# Patient Record
Sex: MALE | Race: WHITE | HISPANIC OR LATINO | Employment: PART TIME | URBAN - METROPOLITAN AREA
[De-identification: names, ages, dates, MRNs, and addresses within clinical notes are randomized per-mention and may not be internally consistent; named-entity substitution may affect disease eponyms.]

---
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
## 2022-01-12 DIAGNOSIS — J45.50 SEVERE PERSISTENT ASTHMA, UNSPECIFIED WHETHER COMPLICATED: ICD-10-CM

## 2022-01-12 DIAGNOSIS — L30.9 ECZEMA, UNSPECIFIED TYPE: ICD-10-CM

## 2022-01-12 RX ORDER — DUPILUMAB 300 MG/2ML
300 INJECTION, SOLUTION SUBCUTANEOUS
Qty: 4 ML | Refills: 3 | Status: CANCELLED | OUTPATIENT
Start: 2022-01-12 | End: 2022-02-09

## 2022-01-12 RX ORDER — ALBUTEROL SULFATE 0.83 MG/ML
2.5 SOLUTION RESPIRATORY (INHALATION)
Status: CANCELLED | OUTPATIENT
Start: 2022-01-12 | End: 2022-01-12

## 2022-01-12 NOTE — TELEPHONE ENCOUNTER
----- Message from Eleni Zamarripa sent at 1/12/2022 11:34 AM CST -----  Contact: Pop [phone]  Requesting an RX refill or new RX.  Is this a refill or new RX: New  RX name and strength (copy/paste from chart): albuterol (PROVENTIL/VENTOLIN HFA) 90 mcg/actuation inhaler  Is this a 30 day or 90 day RX:   Patient advised that in the future they can use their [employer] account to request a refill?:  Yes  Pharmacy name and phone # (copy/paste from chart):  [id number] - [place], [place] - [address] AT [facility]) &  Comments:     Requesting an RX refill or new RX.  Is this a refill or new RX: New  RX name and strength (copy/paste from chart): albuterol (PROVENTIL) 2.5 mg /3 mL (0.083 %) nebulizer solution  Is this a 30 day or 90 day RX:   Patient advised that in the future they can use their [employer] account to request a refill?:  Yes  Pharmacy name and phone # (copy/paste from chart):  [id number] - [place], [place] - [address] AT [facility])   Comments:     Requesting an RX refill or new RX.  Is this a refill or new RX: New  RX name and strength (copy/paste from chart): dupilumab (DUPIXENT SYRINGE) 300 mg/2 mL Syrg  Is this a 30 day or 90 day RX:   Patient advised that in the future they can use their [employer] account to request a refill?:   Pharmacy name and phone # (copy/paste from chart):  [id number] - [place], [place] - [address] AT [facility])   Comments:     Comments: PATIENT is requesting a call back. He is in need of a New Neb Machine. He states he has been using his Aunt's machine.

## 2022-01-12 NOTE — TELEPHONE ENCOUNTER
Advised patient that we would forward this refill request to Dr. Pantoja. Patient verbalized understanding. Stated that we would reach out again.

## 2022-01-13 NOTE — TELEPHONE ENCOUNTER
Called Pop and relayed message below from Dr. Pantoja. He will call to schedule appointment with Adult Allergy and let me know when that appt is so that I can send his refills to Dr. Pantoja.      Please reach out to this patient.  I saw him in June, and referred to Dr. Polanco as adult transition.  Never made appointment with him.  This is the second time since I have been asked to do refills.  Have them make the allergy appointment and let us know when it is, then I will do refills to last until then.

## 2022-01-20 ENCOUNTER — OFFICE VISIT (OUTPATIENT)
Dept: URGENT CARE | Facility: CLINIC | Age: 22
End: 2022-01-20
Payer: MEDICAID

## 2022-01-20 VITALS
RESPIRATION RATE: 16 BRPM | HEIGHT: 69 IN | TEMPERATURE: 99 F | OXYGEN SATURATION: 97 % | BODY MASS INDEX: 38.51 KG/M2 | WEIGHT: 260 LBS | SYSTOLIC BLOOD PRESSURE: 131 MMHG | DIASTOLIC BLOOD PRESSURE: 87 MMHG

## 2022-01-20 DIAGNOSIS — U07.1 COVID-19 VIRUS INFECTION: ICD-10-CM

## 2022-01-20 DIAGNOSIS — U07.1 COVID-19 VIRUS DETECTED: ICD-10-CM

## 2022-01-20 DIAGNOSIS — J02.9 SORE THROAT: Primary | ICD-10-CM

## 2022-01-20 LAB
CTP QC/QA: YES
SARS-COV-2 RDRP RESP QL NAA+PROBE: POSITIVE

## 2022-01-20 PROCEDURE — 1160F PR REVIEW ALL MEDS BY PRESCRIBER/CLIN PHARMACIST DOCUMENTED: ICD-10-PCS | Mod: CPTII,S$GLB,, | Performed by: FAMILY MEDICINE

## 2022-01-20 PROCEDURE — 3079F PR MOST RECENT DIASTOLIC BLOOD PRESSURE 80-89 MM HG: ICD-10-PCS | Mod: CPTII,S$GLB,, | Performed by: FAMILY MEDICINE

## 2022-01-20 PROCEDURE — 1159F PR MEDICATION LIST DOCUMENTED IN MEDICAL RECORD: ICD-10-PCS | Mod: CPTII,S$GLB,, | Performed by: FAMILY MEDICINE

## 2022-01-20 PROCEDURE — 1159F MED LIST DOCD IN RCRD: CPT | Mod: CPTII,S$GLB,, | Performed by: FAMILY MEDICINE

## 2022-01-20 PROCEDURE — U0002: ICD-10-PCS | Mod: QW,S$GLB,, | Performed by: FAMILY MEDICINE

## 2022-01-20 PROCEDURE — 1160F RVW MEDS BY RX/DR IN RCRD: CPT | Mod: CPTII,S$GLB,, | Performed by: FAMILY MEDICINE

## 2022-01-20 PROCEDURE — 3075F SYST BP GE 130 - 139MM HG: CPT | Mod: CPTII,S$GLB,, | Performed by: FAMILY MEDICINE

## 2022-01-20 PROCEDURE — 3075F PR MOST RECENT SYSTOLIC BLOOD PRESS GE 130-139MM HG: ICD-10-PCS | Mod: CPTII,S$GLB,, | Performed by: FAMILY MEDICINE

## 2022-01-20 PROCEDURE — 99203 OFFICE O/P NEW LOW 30 MIN: CPT | Mod: S$GLB,,, | Performed by: FAMILY MEDICINE

## 2022-01-20 PROCEDURE — 99203 PR OFFICE/OUTPT VISIT, NEW, LEVL III, 30-44 MIN: ICD-10-PCS | Mod: S$GLB,,, | Performed by: FAMILY MEDICINE

## 2022-01-20 PROCEDURE — 3008F BODY MASS INDEX DOCD: CPT | Mod: CPTII,S$GLB,, | Performed by: FAMILY MEDICINE

## 2022-01-20 PROCEDURE — U0002 COVID-19 LAB TEST NON-CDC: HCPCS | Mod: QW,S$GLB,, | Performed by: FAMILY MEDICINE

## 2022-01-20 PROCEDURE — 3079F DIAST BP 80-89 MM HG: CPT | Mod: CPTII,S$GLB,, | Performed by: FAMILY MEDICINE

## 2022-01-20 PROCEDURE — 3008F PR BODY MASS INDEX (BMI) DOCUMENTED: ICD-10-PCS | Mod: CPTII,S$GLB,, | Performed by: FAMILY MEDICINE

## 2022-01-20 RX ORDER — DEXTROMETHORPHAN HBR, GUAIFENESIN AND PSEUDOEPHEDRINE HCL 60; 380; 20 MG/1; MG/1; MG/1
1 TABLET ORAL EVERY 6 HOURS PRN
Qty: 20 TABLET | Refills: 0 | Status: SHIPPED | OUTPATIENT
Start: 2022-01-20 | End: 2022-03-10

## 2022-01-20 RX ORDER — NAPROXEN 500 MG/1
500 TABLET ORAL 2 TIMES DAILY WITH MEALS
Qty: 20 TABLET | Refills: 0 | Status: SHIPPED | OUTPATIENT
Start: 2022-01-20 | End: 2022-03-10

## 2022-01-20 NOTE — PROGRESS NOTES
"Subjective:       Patient ID: Pop Lawler is a 21 y.o. male.    Vitals:  height is 5' 9" (1.753 m) and weight is 117.9 kg (260 lb). His temperature is 98.8 °F (37.1 °C). His blood pressure is 131/87. His respiration is 16 and oxygen saturation is 97%.     Chief Complaint: Sore Throat    Sore Throat   This is a new (Pt c/o sore throat, nonproductive cough, and headaches x1 day. Unvaccinated ) problem. The current episode started yesterday. The pain is worse on the left side. There has been no fever. The pain is at a severity of 7/10. The pain is moderate. Associated symptoms include coughing, headaches, swollen glands and trouble swallowing. Treatments tried: motrin, mucinex  The treatment provided no relief.       Constitution: Negative.   HENT: Positive for sinus pressure, sore throat and trouble swallowing.    Cardiovascular: Negative.    Eyes: Negative.    Respiratory: Positive for cough.    Gastrointestinal: Negative.    Endocrine: negative.   Genitourinary: Negative.    Musculoskeletal: Negative.    Skin: Negative.    Allergic/Immunologic: Negative.    Neurological: Positive for headaches.   Hematologic/Lymphatic: Negative.    Psychiatric/Behavioral: Negative.        Objective:      Physical Exam   Constitutional: He is oriented to person, place, and time. He appears well-developed and well-nourished. He is cooperative.  Non-toxic appearance. He does not have a sickly appearance. He does not appear ill. No distress.   HENT:   Head: Normocephalic and atraumatic.   Ears:   Right Ear: Hearing, tympanic membrane, external ear and ear canal normal.   Left Ear: Hearing, tympanic membrane, external ear and ear canal normal.   Nose: Nose normal. No mucosal edema, rhinorrhea or nasal deformity. No epistaxis. Right sinus exhibits no maxillary sinus tenderness and no frontal sinus tenderness. Left sinus exhibits no maxillary sinus tenderness and no frontal sinus tenderness.   Mouth/Throat: Uvula is midline, oropharynx " is clear and moist and mucous membranes are normal. No trismus in the jaw. Normal dentition. No uvula swelling. No oropharyngeal exudate, posterior oropharyngeal edema or posterior oropharyngeal erythema.   Eyes: Conjunctivae and lids are normal. No scleral icterus.   Neck: Trachea normal and phonation normal. Neck supple. No edema present. No erythema present. No neck rigidity present.   Cardiovascular: Normal rate, regular rhythm, normal heart sounds, intact distal pulses and normal pulses.   Pulmonary/Chest: Effort normal and breath sounds normal. No respiratory distress. He has no decreased breath sounds. He has no rhonchi.   Abdominal: Normal appearance.   Musculoskeletal: Normal range of motion.         General: No deformity or edema. Normal range of motion.   Neurological: He is alert and oriented to person, place, and time. He exhibits normal muscle tone. Coordination normal.   Skin: Skin is warm, dry, intact, not diaphoretic and not pale.   Psychiatric: He has a normal mood and affect. His speech is normal and behavior is normal. Judgment and thought content normal. Cognition and memory  Nursing note and vitals reviewed.    Results for orders placed or performed in visit on 01/20/22   POCT COVID-19 Rapid Screening   Result Value Ref Range    POC Rapid COVID Positive (A) Negative     Acceptable Yes            Assessment:       1. Sore throat    2. COVID-19 virus infection          Plan:         Sore throat  -     POCT COVID-19 Rapid Screening    COVID-19 virus infection  -     pseudoephedrine-DM-guaiFENesin (POLY-VENT DM) 60- mg Tab; Take 1 tablet by mouth every 6 (six) hours as needed.  Dispense: 20 tablet; Refill: 0  -     naproxen (NAPROSYN) 500 MG tablet; Take 1 tablet (500 mg total) by mouth 2 (two) times daily with meals.  Dispense: 20 tablet; Refill: 0     Covid risk Score 3    Please drink plenty of fluids.  Please get plenty of rest.  Please return here or go to the Emergency  Department for any concerns or worsening of condition.    You have tested positive for COVID-19 today.  Please note that patients who test positive for COVID-19 are required by the CDC to undergo isolation for 5 days after their symptoms first began, followed by a 5 day period of strict mask wearing.  This isolation starts from the day you first developed symptoms, not the day of your positive test. For example, if your symptoms began on a Monday but tested positive on the following Wednesday, your 5-day isolation begins from that Monday, not the Wednesday you tested positive.  However, if you are asymptomatic (a person who does not have any symptoms) and COVID-19 positive, your 5-day isolation begins on the day you tested positive, regardless of exposure date.  Also, per the CDC guidelines, once your 5 days have passed, and you have not had fever greater than 100.4F in the last 24 hours without taking any fever reducers such as Tylenol (Acetaminophen) or Motrin (Ibuprofen), you may return to your normal activities including social distancing, wearing masks, and frequent handwashing - YOU DO NOT NEED ANOTHER TEST IN ORDER TO END YOUR QUARANTINE.     If you were prescribed a narcotic medication, do not drive or operate heavy equipment or machinery while taking these medications.    You were given a decongestant (RESCON or POLY VENT Dm).  If your insurance does not cover it or you cannot afford it, it is ok to use the over the counter products listed below.  If you do not have Hypertension or any history of palpitations, it is ok to take over the counter Sudafed or Mucinex D or Allegra-D or Claritin-D or Zyrtec-D.  If you do take one of the above, it is ok to combine that with plain over the counter Mucinex or Allegra or Claritin or Zyrtec.  If for example you are taking Zyrtec -D, you can combine that with Mucinex, but not Mucinex-D.  If you are taking Mucinex-D, you can combine that with plain Allegra or Claritin or  Zyrtec.   If you do have Hypertension or palpitations, it is safe to take Coricidin HBP for relief of sinus symptoms.    We recommend you take over the counter Flonase (Fluticasone) or another nasally inhaled steroid unless you are already taking one.  Nasal irrigation with a saline spray or Netti Pot like device per their directions is also recommended.  If not allergic, please take over the counter Tylenol (Acetaminophen) and/or Motrin (Ibuprofen) as directed for control of pain and/or fever.    We recommend Vitamin C (1000mg daily), Vitamin d3 (125mg daily), and Zinc (100mg daily) to help combat the Coronavirus.    Robitussin DM 2 teas every 4 hours as needed for cough.  If you  smoke, please stop smoking.    Please follow up with your primary care doctor or specialist as needed.  Felipa Harris MD  [phone]      You must understand that you have received treatment at an Urgent Care facility only, and that you may be  released before all of your medical problems are known or treated. Urgent Care facilities are not equipped to  handle life threatening emergencies. It is recommended that you seek care at an Emergency Department for  further evaluation of worsening or concerning symptoms, or possibly life threatening conditions as  Discussed.

## 2022-01-20 NOTE — PATIENT INSTRUCTIONS
Please drink plenty of fluids.  Please get plenty of rest.  Please return here or go to the Emergency Department for any concerns or worsening of condition.    You have tested positive for COVID-19 today.  Please note that patients who test positive for COVID-19 are required by the CDC to undergo isolation for 5 days after their symptoms first began, followed by a 5 day period of strict Mask wearing.  This isolation starts from the day you first developed symptoms, not the day of your positive test. For example, if your symptoms began on a Monday but tested positive on the following Wednesday, your 5 day isolation begins from that Monday, not the Wednesday you tested positive.  However, if you are asymptomatic (a person who does not have any symptoms) and COVID-19 positive, your 5 day isolation begins on the day you tested positive, regardless of exposure date.  Also, per the CDC guidelines, once your 5 days have passed, and you have not had fever greater than 100.4F in the last 24 hours without taking any fever reducers such as Tylenol (Acetaminophen) or Motrin (Ibuprofen), you may return to your normal activities including social distancing, wearing masks, and frequent handwashing - YOU DO NOT NEED ANOTHER TEST IN ORDER TO END YOUR QUARANTINE.     If you were prescribed a narcotic medication, do not drive or operate heavy equipment or machinery while taking these medications.    You were given a decongestant (RESCON or POLY VENT Dm).  If your insurance does not cover it or you cannot afford it, it is ok to use the over the counter products listed below.  If you do not have Hypertension or any history of palpitations, it is ok to take over the counter Sudafed or Mucinex D or Allegra-D or Claritin-D or Zyrtec-D.  If you do take one of the above, it is ok to combine that with plain over the counter Mucinex or Allegra or Claritin or Zyrtec.  If for example you are taking Zyrtec -D, you can combine that with Mucinex,  but not Mucinex-D.  If you are taking Mucinex-D, you can combine that with plain Allegra or Claritin or Zyrtec.   If you do have Hypertension or palpitations, it is safe to take Coricidin HBP for relief of sinus symptoms.    We recommend you take over the counter Flonase (Fluticasone) or another nasally inhaled steroid unless you are already taking one.  Nasal irrigation with a saline spray or Netti Pot like device per their directions is also recommended.  If not allergic, please take over the counter Tylenol (Acetaminophen) and/or Motrin (Ibuprofen) as directed for control of pain and/or fever.    We recommend Vitamin C (1000mg daily), Vitamin d3 (125mg daily), and Zinc (100mg daily) to help combat the Coronavirus.    Robitussin DM 2 teas every 4 hours as needed for cough.  If you  smoke, please stop smoking.    Please follow up with your primary care doctor or specialist as needed.  Felipa Harris MD  [phone]    Covid risk Score 3    You must understand that you have received treatment at an Urgent Care facility only, and that you may be  released before all of your medical problems are known or treated. Urgent Care facilities are not equipped to  handle life threatening emergencies. It is recommended that you seek care at an Emergency Department for  further evaluation of worsening or concerning symptoms, or possibly life threatening conditions as  Discussed.    Instructions for Patients with Confirmed or Suspected COVID-19    If you are awaiting your test result, you will either be called or it will be released to the patient portal.  If you have any questions about your test, please visit [email or web address] or call our COVID-19 information line at [phone].      Instructions for non-hospitalized or discharged patients with confirmed or suspected COVID-19:       Stay home except to get medical care.    Separate yourself from other people and animals in your home.    Call ahead  before visiting your doctor.    Wear a face mask.    Cover your coughs and sneezes.    Clean your hands often.    Avoid sharing personal household items.    Clean all high-touch surfaces every day.    Monitor your symptoms. Seek prompt medical attention if your illness is worsening (e.g., difficulty breathing). Before seeking care, call your healthcare provider.    If you have a medical emergency and must call 911, notify the dispatcher that you have or are being evaluated for COVID-19. If possible, put on a face mask before emergency medical services arrive.    Use the following symptom-based strategy to return to normal activity following a suspected or confirmed case of COVID-19. Continue isolation until:   o At least 1 days (24 hours) have passed since recovery defined as resolution of fever without the use of fever-reducing medications and improvement in respiratory symptoms (e.g. cough, shortness of breath), and   o At least 5 days have passed since the first positive test.       As one of the next steps, you will receive a call or text from the [place] Department of Health ([facility]) COVID-19 Tracing Team. See the contact information below so you know not to ignore the health departments call. It is important that you contact them back immediately so they can help.     Contact Tracer Number:  [phone]  Caller ID for most carriers: [place] Dept Health    What is contact tracing?   Contact tracing is a process that helps identify everyone who has been in close contact with an infected person. Contact tracers let those people know they may have been exposed and guide them on next steps. Confidentiality is important for everyone; no one will be told who may have exposed them to the virus.   Your involvement is important. The more we know about where and how this virus is spreading, the better chance we have at stopping it from spreading further.  What does exposure mean?   Exposure means you have been  within 6 feet for more than 15 minutes with a person who has or had COVID-19.  What kind of questions do the contact tracers ask?   A contact tracer will confirm your basic contact information including name, address, phone number, and next of kin, as well as asking about any symptoms you may have had. Theyll also ask you how you think you may have gotten sick, such as places where you may have been exposed to the virus, and people you were with. Those names will never be shared with anyone outside of that call, and will only be used to help trace and stop the spread of the virus.   I have privacy concerns. How will the state use my information?   Your privacy about your health is important. All calls are completed using call centers that use the appropriate health privacy protection measures (HIPAA compliance), meaning that your patient information is safe. No one will ever ask you any questions related to immigration status. Your health comes first.   Do I have to participate?   You do not have to participate, but we strongly encourage you to. Contact tracing can help us catch and control new outbreaks as theyre developing to keep your friends and family safe.   What if I dont hear from anyone?   If you dont receive a call within 24 hours, you can call the number above right away to inquire about your status. That line is open from 8:00 am - 8:00 p.m., 7 days a week.  Contact tracing saves lives! Together, we have the power to beat this virus and keep our loved ones and neighbors safe.       Instructions for household members, intimate partners and caregivers in a non-healthcare setting of a patient with confirmed or suspected COVID-19:         Close contacts should monitor their health and call their healthcare provider right away if they develop symptoms suggestive of COVID-19 (e.g., fever, cough, shortness of breath).    Stay home except to get medical care. Separate yourself from other people and  animals in the home.   Monitor the patients symptoms. If the patient is getting sicker, call his or her healthcare provider. If the patient has a medical emergency and you need to call 911, notify the dispatch personnel that the patient has or is being evaluated for COVID-19.    Wear a facemask when around other people such as sharing a room or vehicle and before entering a healthcare provider's office.   Cover coughs and sneezes with a tissue. Throw used tissues in a lined trash can immediately and wash hands.   Clean hands often with soap and water for at least 20 seconds or with an alcohol-based hand , rubbing hands together until they feel dry. Avoid touching your eyes, nose, and mouth with unwashed hands.   Clean all high-touch; surfaces every day, including counters, tabletops, doorknobs, bathroom fixtures, toilets, phones, keyboards, tablets, bedside tables, etc. Use a household cleaning spray or wipe according to label instructions.   Avoid sharing personal household items such as dishes, drinking glasses, cups, towels, bedding, etc. After these items are used, they should be washed thoroughly with soap and water.   Continue isolation until:   At least 1 days (72 hours) have passed since recovery defined as resolution of fever without the use of fever-reducing medications and improvement in respiratory symptoms (e.g. cough, shortness of breath), and    At least 5 days have passed since the patients first positive test.    https://www.cdc.gov/coronavirus/2019-ncov/your-health/index.htm

## 2022-01-20 NOTE — LETTER
[address], [facility] [address] ? [place], [address] ? Phone [phone] ? Fax [phone]           Return to Work/School Status    Patient: Pop Lawler  YOB: 2000  Date: January 20, 2022      [facility] has adopted CDCs time-based return to work/school strategy for persons with confirmed or suspected COVID19. [facility] does not recommend using a test-based strategy for returning to work/school after COVID-19 infection. [facility] has reported prolonged positive test results without evidence of infectiousness. At this time, positive specimens capable of producing disease have not been isolated more than 9 days after onset of illness.   Symptomatic persons with confirmed COVID-19 or suspected COVID-19 can return to work/school after:    At least 1 days (24 hours) have passed since recovery defined as resolution of fever without the use of fever-reducing medications AND improvement in respiratory symptoms (e.g., cough, shortness of breath)    AND, at least 5 days have passed since symptoms first develop.  Asymptomatic persons with confirmed COVID-19 can return to work after:   At least 5 days have passed since the positive laboratory test and the person remains asymptomatic.  More information about the science behind the symptom-based return to work/school can be found at: https://www.cdc.gov/coronavirus/2019-ncov/community/mplbutms-bdqfmawcifp-iiyruqpjh.html    Sincerely,    Theo Jennings MD

## 2022-01-31 ENCOUNTER — PATIENT MESSAGE (OUTPATIENT)
Dept: PEDIATRIC PULMONOLOGY | Facility: CLINIC | Age: 22
End: 2022-01-31
Payer: MEDICAID

## 2022-01-31 ENCOUNTER — TELEPHONE (OUTPATIENT)
Dept: ALLERGY | Facility: CLINIC | Age: 22
End: 2022-01-31
Payer: MEDICAID

## 2022-01-31 ENCOUNTER — TELEPHONE (OUTPATIENT)
Dept: PEDIATRIC PULMONOLOGY | Facility: CLINIC | Age: 22
End: 2022-01-31
Payer: MEDICAID

## 2022-01-31 DIAGNOSIS — L30.9 ECZEMA, UNSPECIFIED TYPE: ICD-10-CM

## 2022-01-31 DIAGNOSIS — J45.50 SEVERE PERSISTENT ASTHMA, UNSPECIFIED WHETHER COMPLICATED: ICD-10-CM

## 2022-01-31 RX ORDER — DUPILUMAB 300 MG/2ML
300 INJECTION, SOLUTION SUBCUTANEOUS
Qty: 4 ML | Refills: 3 | OUTPATIENT
Start: 2022-01-31 | End: 2022-02-28

## 2022-01-31 NOTE — TELEPHONE ENCOUNTER
Called and spoke to patient. Informed that the provider would like for him to see allergy regarding Dupixent and wanted us to follow up on that. The patient stated that they had reached out to schedule but he had forgotten. I provided him the number to allergy and informed him of the name of the provider Dr. Pantoja had referred him to. Informed Pop that I would let Dr. Pantoja know that the patient was working on scheduling with allergy. Will also send a message to Dr. Polanco's staff.

## 2022-01-31 NOTE — TELEPHONE ENCOUNTER
----- Message from Bryce Pride RN sent at 1/31/2022  4:02 PM CST -----  Regarding: Allergy Appointment  Good afternoon,     Dr. Pantoja placed a referral for this patient to see allergy. The patient had sent us a refill request this morning for Dupixent and Dr. Pantoja had asked us to follow up on his allergy appointment. I called and spoke to the patient who states that he had received a call to schedule but he never scheduled. I provided him with the number to call you guys but wanted to also send a staff message in case the patient does not follow through.     Thanks,   Bryce

## 2022-01-31 NOTE — TELEPHONE ENCOUNTER
Spoke with the patient and scheduled an appointment at [facility] with Dr. Polanco tomorrow, 02/01 at 2PM per his referral. Dr. Pantoja's office notified.

## 2022-01-31 NOTE — TELEPHONE ENCOUNTER
From refill encounter 1/12/22  RN note  Called Pop and relayed message below from Dr. Pantoja. He will call to schedule appointment with Adult Allergy and let me know when that appt is so that I can send his refills to Dr. Pantoja.     Note by me  Please reach out to this patient.  I saw him in June, and referred to Dr. Polanco as adult transition.  Never made appointment with him.  This is the second time since I have been asked to do refills.  Have them make the allergy appointment and let us know when it is, then I will do refills to last until then.

## 2022-02-01 ENCOUNTER — SPECIALTY PHARMACY (OUTPATIENT)
Dept: PHARMACY | Facility: CLINIC | Age: 22
End: 2022-02-01
Payer: MEDICAID

## 2022-02-01 ENCOUNTER — OFFICE VISIT (OUTPATIENT)
Dept: ALLERGY | Facility: CLINIC | Age: 22
End: 2022-02-01
Payer: MEDICAID

## 2022-02-01 VITALS — WEIGHT: 261.13 LBS | BODY MASS INDEX: 38.68 KG/M2 | HEIGHT: 69 IN

## 2022-02-01 DIAGNOSIS — J45.998 WELL CONTROLLED PERSISTENT ASTHMA: ICD-10-CM

## 2022-02-01 DIAGNOSIS — L20.9 ATOPIC DERMATITIS, UNSPECIFIED TYPE: Primary | ICD-10-CM

## 2022-02-01 DIAGNOSIS — L30.9 ECZEMA, UNSPECIFIED TYPE: ICD-10-CM

## 2022-02-01 DIAGNOSIS — Z91.013 SHELLFISH ALLERGY: ICD-10-CM

## 2022-02-01 PROCEDURE — 99999 PR PBB SHADOW E&M-EST. PATIENT-LVL III: CPT | Mod: PBBFAC,,, | Performed by: ALLERGY & IMMUNOLOGY

## 2022-02-01 PROCEDURE — 99213 OFFICE O/P EST LOW 20 MIN: CPT | Mod: PBBFAC,PO | Performed by: ALLERGY & IMMUNOLOGY

## 2022-02-01 PROCEDURE — 99999 PR PBB SHADOW E&M-EST. PATIENT-LVL III: ICD-10-PCS | Mod: PBBFAC,,, | Performed by: ALLERGY & IMMUNOLOGY

## 2022-02-01 PROCEDURE — 1159F MED LIST DOCD IN RCRD: CPT | Mod: CPTII,,, | Performed by: ALLERGY & IMMUNOLOGY

## 2022-02-01 PROCEDURE — 3008F BODY MASS INDEX DOCD: CPT | Mod: CPTII,,, | Performed by: ALLERGY & IMMUNOLOGY

## 2022-02-01 PROCEDURE — 3008F PR BODY MASS INDEX (BMI) DOCUMENTED: ICD-10-PCS | Mod: CPTII,,, | Performed by: ALLERGY & IMMUNOLOGY

## 2022-02-01 PROCEDURE — 99204 OFFICE O/P NEW MOD 45 MIN: CPT | Mod: S$PBB,,, | Performed by: ALLERGY & IMMUNOLOGY

## 2022-02-01 PROCEDURE — 99204 PR OFFICE/OUTPT VISIT, NEW, LEVL IV, 45-59 MIN: ICD-10-PCS | Mod: S$PBB,,, | Performed by: ALLERGY & IMMUNOLOGY

## 2022-02-01 PROCEDURE — 1159F PR MEDICATION LIST DOCUMENTED IN MEDICAL RECORD: ICD-10-PCS | Mod: CPTII,,, | Performed by: ALLERGY & IMMUNOLOGY

## 2022-02-01 RX ORDER — EPINEPHRINE 0.3 MG/.3ML
1 INJECTION SUBCUTANEOUS ONCE
Qty: 2 EACH | Refills: 1 | Status: SHIPPED | OUTPATIENT
Start: 2022-02-01 | End: 2022-02-01

## 2022-02-01 RX ORDER — DUPILUMAB 300 MG/2ML
300 INJECTION, SOLUTION SUBCUTANEOUS
Qty: 4 ML | Refills: 11 | Status: SHIPPED | OUTPATIENT
Start: 2022-02-01 | End: 2022-12-30 | Stop reason: SDUPTHER

## 2022-02-01 NOTE — PROGRESS NOTES
Subjective:       Patient ID: Pop Lawler is a 21 y.o. male.    Chief Complaint:  Asthma and Allergies    Referred by Dr. Sloan    HPI    Pt presents to [facility], continue Dupixent therapy. He is on Dupixent for both asthma and atopic dermatitis. Has had both since early childhood. Years ago had been on Xolair. Stopped xolair when Dupixent rxd by dermatology for his atopic dermatitis. Follows w [facility]. Lately Dupixent had been written by pulmonary.    On Dupixent, he has been able to stop daily ICS. Hasn't needed oral steroids since starting Dupixent. Albuterol use is rare. Occ needs it w over exertion.  Atopic dermatitis is well controlled w routine use Cerave moisturizer. Has not been needing topical steroids lately. Does note a slight increase in pruritus days prior to scheduled dupixent.  Recalls positive patch testing w dermatology 2 weeks ago, for recent contact dermatitis. Doesn't recall to what agents.  Also w hx shellfish allergy--needs epipen        Past Medical History:   Diagnosis Date    ADHD (attention deficit hyperactivity disorder)     Allergy, unspecified not elsewhere classified     IgE 2810, immunocap positive for multiple aeroallergens    Asthma, not well controlled     Asthma, well controlled     Conjunctivitis, allergic     Conjunctivitis, allergic     Eczema     Patient non adherence     Rhinitis, allergic     Rhinitis, allergic        Family History   Problem Relation Age of Onset    Asthma Mother     No Known Problems Father          Review of Systems   Constitutional: Negative for activity change, fatigue and fever.   HENT: Negative for congestion, postnasal drip, rhinorrhea, sinus pressure and sneezing.    Eyes: Negative for discharge, redness and itching.   Respiratory: Negative for cough, shortness of breath and wheezing.    Cardiovascular: Negative for chest pain.   Gastrointestinal: Negative for constipation, diarrhea, nausea and vomiting.    Genitourinary: Negative for difficulty urinating.   Musculoskeletal: Negative for joint swelling and myalgias.   Skin: Negative for rash.   Neurological: Negative for headaches.   Hematological: Does not bruise/bleed easily.   Psychiatric/Behavioral: Negative for behavioral problems and sleep disturbance.        Objective:   Physical Exam  Constitutional:       General: He is not in acute distress.     Appearance: He is well-developed and well-nourished. He is not diaphoretic.   HENT:      Head: Normocephalic and atraumatic.      Right Ear: Tympanic membrane and external ear normal.      Left Ear: Tympanic membrane and external ear normal.      Nose: Nose normal.      Mouth/Throat:      Mouth: Oropharynx is clear and moist.      Pharynx: No oropharyngeal exudate.   Eyes:      General:         Right eye: No discharge.         Left eye: No discharge.      Conjunctiva/sclera: Conjunctivae normal.   Neck:      Thyroid: No thyromegaly.   Cardiovascular:      Rate and Rhythm: Normal rate and regular rhythm.   Pulmonary:      Effort: Pulmonary effort is normal. No respiratory distress.      Breath sounds: Normal breath sounds. No wheezing.   Abdominal:      General: Bowel sounds are normal. There is no distension.      Palpations: Abdomen is soft.      Tenderness: There is no abdominal tenderness.   Musculoskeletal:         General: No edema. Normal range of motion.      Cervical back: Normal range of motion and neck supple.   Lymphadenopathy:      Cervical: No cervical adenopathy.   Skin:     General: Skin is warm and dry.      Findings: No erythema or rash.   Neurological:      Mental Status: He is alert and oriented to person, place, and time.      Motor: No abnormal muscle tone.   Psychiatric:         Mood and Affect: Mood and affect normal.         Behavior: Behavior normal.         Thought Content: Thought content normal.         Judgment: Judgment normal.           Assessment:       1. Atopic dermatitis,  unspecified type    2. Well controlled persistent asthma    3. Eczema, unspecified type    4. Shellfish allergy         Plan:       Pop was seen today for asthma and allergies.    Diagnoses and all orders for this visit:    Atopic dermatitis, unspecified type  -     dupilumab (DUPIXENT PEN) 300 mg/2 mL PnIj; Inject 300 mg into the skin every 14 (fourteen) days.    Well controlled persistent asthma    -     dupilumab (DUPIXENT PEN) 300 mg/2 mL PnIj; Inject 300 mg into the skin every 14 (fourteen) days.     Prn albuterol    Eczema, unspecified type  dupixent  Cont routine moisturization    Shellfish allergy  -     EPINEPHrine (EPIPEN 2-BRYAN) 0.3 mg/0.3 mL AtIn; Inject 0.3 mLs (0.3 mg total) into the muscle once. for 1 dose  Continue strict shellfish avoidance

## 2022-02-01 NOTE — TELEPHONE ENCOUNTER
Dupixent refills received. PA not required. Dupixent 300 mg into the skin every 14 day is appropriate for the diagnosis of severe persistent asthma [J45.909]. change in dosage form (intervention opened). Therapy appropriate to continue

## 2022-02-02 ENCOUNTER — SPECIALTY PHARMACY (OUTPATIENT)
Dept: PHARMACY | Facility: CLINIC | Age: 22
End: 2022-02-02
Payer: MEDICAID

## 2022-02-02 ENCOUNTER — PATIENT MESSAGE (OUTPATIENT)
Dept: PHARMACY | Facility: CLINIC | Age: 22
End: 2022-02-02
Payer: MEDICAID

## 2022-02-02 NOTE — TELEPHONE ENCOUNTER
Specialty Pharmacy - Refill Coordination  Specialty Pharmacy - Clinical Intervention    Specialty Medication Orders Linked to Encounter    Flowsheet Row Most Recent Value   Medication #1 dupilumab (DUPIXENT PEN) 300 mg/2 mL PnIj (Order#[id number], Rx#[id number])          Refill Questions - Documented Responses    Flowsheet Row Most Recent Value   Patient Availability and HIPAA Verification    Does patient want to proceed with activity? Yes   HIPAA/medical authority confirmed? Yes   Relationship to patient of person spoken to? Self   Refill Screening Questions    Changes to allergies? No   Changes to medications? No   New conditions since last clinic visit? No   Unplanned office visit, urgent care, ED, or hospital admission in the last 4 weeks? No   How does patient/caregiver feel medication is working? Excellent   Financial problems or insurance changes? No   How many doses of your specialty medications were missed in the last 4 weeks? 0   Would patient like to speak to a pharmacist? No   When does the patient need to receive the medication? 02/04/22   Refill Delivery Questions    How will the patient receive the medication? Delivery Dunia   When does the patient need to receive the medication? 02/04/22   Shipping Address Home   Address in [facility] confirmed and updated if neccessary? Yes   Expected Copay ($) 0   Is the patient able to afford the medication copay? Yes   Payment Method zero copay   Days supply of Refill 28   Supplies needed? No supplies needed   Refill activity completed? Yes   Refill activity plan Refill scheduled   Shipment/Pickup Date: 02/03/22          Current Outpatient Medications   Medication Sig    albuterol (PROVENTIL/VENTOLIN HFA) 90 mcg/actuation inhaler Inhale 2 puffs into the lungs every 4 (four) hours as needed. Rescue (Patient not taking: Reported on 6/23/2021)    azelastine (ASTELIN) 137 mcg (0.1 %) nasal spray 1 spray (137 mcg total) by Nasal route 2 (two) times daily.  (Patient not taking: Reported on 6/23/2021)    cetirizine (ZYRTEC) 10 MG tablet Take 1 tablet (10 mg total) by mouth once daily.    diphenhydrAMINE (BENADRYL) 25 mg capsule Take 25 mg by mouth once daily. Takes one 25mg capsule every morning    dupilumab (DUPIXENT PEN) 300 mg/2 mL PnIj Inject 300 mg into the skin every 14 (fourteen) days.    EPINEPHrine (EPIPEN 2-BRYAN) 0.3 mg/0.3 mL AtIn Inject 0.3 mLs (0.3 mg total) into the muscle once. for 1 dose    montelukast (SINGULAIR) 10 mg tablet Take 1 tablet (10 mg total) by mouth every evening.    naproxen (NAPROSYN) 500 MG tablet Take 1 tablet (500 mg total) by mouth 2 (two) times daily with meals. (Patient not taking: Reported on 2/1/2022)    pseudoephedrine-DM-guaiFENesin (POLY-VENT DM) 60- mg Tab Take 1 tablet by mouth every 6 (six) hours as needed. (Patient not taking: Reported on 2/1/2022)   Last reviewed on 2/1/2022  2:13 PM by Elizabeth A Bosworth, LPN    Review of patient's allergies indicates:   Allergen Reactions    Iodine and iodide containing products     Shellfish containing products     Last reviewed on  2/1/2022 2:11 PM by Elizabeth Bosworth      Tasks added this encounter   2/25/2022 - Refill Call (Auto Added)   Tasks due within next 3 months   No tasks due.     Zurdo Wooten, PharmD  [facility]  [address] [place] [id number]  Phone: [phone]  Fax: [phone]

## 2022-02-04 ENCOUNTER — PATIENT MESSAGE (OUTPATIENT)
Dept: PEDIATRIC PULMONOLOGY | Facility: CLINIC | Age: 22
End: 2022-02-04
Payer: MEDICAID

## 2022-02-08 RX ORDER — ALBUTEROL SULFATE 90 UG/1
4 AEROSOL, METERED RESPIRATORY (INHALATION) EVERY 4 HOURS PRN
Qty: 8.5 G | Refills: 3 | Status: SHIPPED | OUTPATIENT
Start: 2022-02-08 | End: 2022-09-09 | Stop reason: SDUPTHER

## 2022-02-26 ENCOUNTER — SPECIALTY PHARMACY (OUTPATIENT)
Dept: PHARMACY | Facility: CLINIC | Age: 22
End: 2022-02-26
Payer: MEDICAID

## 2022-02-26 NOTE — TELEPHONE ENCOUNTER
Specialty Pharmacy - Refill Coordination    Specialty Medication Orders Linked to Encounter    Flowsheet Row Most Recent Value   Medication #1 DUPIXENT SYRINGE 300 mg/2 mL Syrg (Order#[id number], Rx#[id number])          Refill Questions - Documented Responses    Flowsheet Row Most Recent Value   Patient Availability and HIPAA Verification    Does patient want to proceed with activity? Yes   HIPAA/medical authority confirmed? Yes   Relationship to patient of person spoken to? Self   Refill Screening Questions    Changes to allergies? No   Changes to medications? No   New conditions since last clinic visit? No   Unplanned office visit, urgent care, ED, or hospital admission in the last 4 weeks? No   How does patient/caregiver feel medication is working? Excellent   Financial problems or insurance changes? No   How many doses of your specialty medications were missed in the last 4 weeks? 0   Would patient like to speak to a pharmacist? No   When does the patient need to receive the medication? 03/04/22   Refill Delivery Questions    How will the patient receive the medication? Delivery Dunia   When does the patient need to receive the medication? 03/04/22   Shipping Address Home   Address in [facility] confirmed and updated if neccessary? Yes   Expected Copay ($) 0   Is the patient able to afford the medication copay? Yes   Payment Method zero copay   Days supply of Refill 28   Supplies needed? No supplies needed   Refill activity completed? Yes   Refill activity plan Refill scheduled   Shipment/Pickup Date: 03/02/22          Current Outpatient Medications   Medication Sig    albuterol (PROVENTIL/VENTOLIN HFA) 90 mcg/actuation inhaler Inhale 4 puffs into the lungs every 4 (four) hours as needed for Wheezing or Shortness of Breath (and/or persistent coughing). Rescue    azelastine (ASTELIN) 137 mcg (0.1 %) nasal spray 1 spray (137 mcg total) by Nasal route 2 (two) times daily. (Patient not taking: Reported on  6/23/2021)    cetirizine (ZYRTEC) 10 MG tablet Take 1 tablet (10 mg total) by mouth once daily.    diphenhydrAMINE (BENADRYL) 25 mg capsule Take 25 mg by mouth once daily. Takes one 25mg capsule every morning    dupilumab (DUPIXENT PEN) 300 mg/2 mL PnIj Inject 300 mg into the skin every 14 (fourteen) days.    EPINEPHrine (EPIPEN 2-BRYAN) 0.3 mg/0.3 mL AtIn Inject 0.3 mLs (0.3 mg total) into the muscle once. for 1 dose    montelukast (SINGULAIR) 10 mg tablet Take 1 tablet (10 mg total) by mouth every evening.    naproxen (NAPROSYN) 500 MG tablet Take 1 tablet (500 mg total) by mouth 2 (two) times daily with meals. (Patient not taking: Reported on 2/1/2022)    pseudoephedrine-DM-guaiFENesin (POLY-VENT DM) 60- mg Tab Take 1 tablet by mouth every 6 (six) hours as needed. (Patient not taking: Reported on 2/1/2022)   Last reviewed on 2/1/2022  2:13 PM by Elizabeth A Bosworth, LPN    Review of patient's allergies indicates:   Allergen Reactions    Iodine and iodide containing products     Shellfish containing products     Last reviewed on  2/1/2022 2:11 PM by Elizabeth Bosworth      Tasks added this encounter   3/25/2022 - Refill Call (Auto Added)   Tasks due within next 3 months   No tasks due.     [facility]  [address] [place] [id number]  Phone: [phone]  Fax: [phone]

## 2022-03-10 ENCOUNTER — OFFICE VISIT (OUTPATIENT)
Dept: PEDIATRIC PULMONOLOGY | Facility: CLINIC | Age: 22
End: 2022-03-10
Payer: MEDICAID

## 2022-03-10 VITALS
HEART RATE: 90 BPM | OXYGEN SATURATION: 98 % | RESPIRATION RATE: 16 BRPM | WEIGHT: 261.44 LBS | BODY MASS INDEX: 38.72 KG/M2 | HEIGHT: 69 IN

## 2022-03-10 DIAGNOSIS — J45.50 SEVERE PERSISTENT ASTHMA WITHOUT COMPLICATION: Primary | ICD-10-CM

## 2022-03-10 PROCEDURE — 1160F RVW MEDS BY RX/DR IN RCRD: CPT | Mod: CPTII,S$GLB,, | Performed by: GENERAL ACUTE CARE HOSPITAL

## 2022-03-10 PROCEDURE — 99214 PR OFFICE/OUTPT VISIT, EST, LEVL IV, 30-39 MIN: ICD-10-PCS | Mod: 25,S$GLB,, | Performed by: GENERAL ACUTE CARE HOSPITAL

## 2022-03-10 PROCEDURE — 99214 OFFICE O/P EST MOD 30 MIN: CPT | Mod: 25,S$GLB,, | Performed by: GENERAL ACUTE CARE HOSPITAL

## 2022-03-10 PROCEDURE — 94664 DEMO&/EVAL PT USE INHALER: CPT | Mod: S$GLB,,, | Performed by: GENERAL ACUTE CARE HOSPITAL

## 2022-03-10 PROCEDURE — 1160F PR REVIEW ALL MEDS BY PRESCRIBER/CLIN PHARMACIST DOCUMENTED: ICD-10-PCS | Mod: CPTII,S$GLB,, | Performed by: GENERAL ACUTE CARE HOSPITAL

## 2022-03-10 PROCEDURE — 1159F PR MEDICATION LIST DOCUMENTED IN MEDICAL RECORD: ICD-10-PCS | Mod: CPTII,S$GLB,, | Performed by: GENERAL ACUTE CARE HOSPITAL

## 2022-03-10 PROCEDURE — 94664 PR DEMO &/OR EVAL,PT USE,AEROSOL DEVICE: ICD-10-PCS | Mod: S$GLB,,, | Performed by: GENERAL ACUTE CARE HOSPITAL

## 2022-03-10 PROCEDURE — 1159F MED LIST DOCD IN RCRD: CPT | Mod: CPTII,S$GLB,, | Performed by: GENERAL ACUTE CARE HOSPITAL

## 2022-03-10 PROCEDURE — 3008F PR BODY MASS INDEX (BMI) DOCUMENTED: ICD-10-PCS | Mod: CPTII,S$GLB,, | Performed by: GENERAL ACUTE CARE HOSPITAL

## 2022-03-10 PROCEDURE — 3008F BODY MASS INDEX DOCD: CPT | Mod: CPTII,S$GLB,, | Performed by: GENERAL ACUTE CARE HOSPITAL

## 2022-03-10 RX ORDER — HYDROCORTISONE 25 MG/G
OINTMENT TOPICAL
COMMUNITY
Start: 2022-01-05

## 2022-03-10 NOTE — PATIENT INSTRUCTIONS
Summary    1. Asthma    Continue Dupixent as prescribed    Continue the following rescue medications:  -Albuterol MDI 4 puffs every 4 hours as needed with spacer     2.Asthma action plan and spacer education provided    3. Soak and Seal  The Soak and Seal method of treating eczema is recommended by many providers to combat dry skin and reduce flares. To get the full therapeutic benefit, Soak and Seal often and follow these steps in order.  Instructions to Soak and Seal:  Take a bath using lukewarm (not hot) water for five to 10 minutes. Use a gentle cleanser (no soaps) and avoid scrubbing the affected skin.  After bathing, pat the skin lightly with a towel leaving it slightly damp.  Apply prescription topical medication(Hydrocortisone and Cerave) to the affected areas of skin as directed.  Within three minutes, liberally apply a moisturizer all over the body. Its important to apply the moisturizer within three minutes or the skin may become even drier.  Wait a few minutes to let the moisturizer absorb into the skin before dressing or applying wet wraps.      Follow up in 3 months              Asthma Action Plan for Pop Lawler     Pulmonologist:  Dr. Leyden Lozada  Contact number:  [phone]     Rescue medication:  Albuterol  Control medication(s): Dupixent     Please bring this plan and all your medications to each visit to our office or the emergency room.    GREEN ZONE: Doing Well   No cough, wheeze, chest tightness or shortness of breath during the day or night  Can do your usual activities  If a peak flow meter is used, peak flow 80% or more of my best    Take this medication each day   Medicine How much to take When to take it   Dupixent  Every 2 weeks                           Take this medication before exercise if your asthma is exercise-induced   Medicine How much to take When to take it   Albuterol 2 puffs 15 minutes before exercise            YELLOW ZONE: Asthma is Getting Worse   Cough,  wheeze, chest tightness or shortness of breath or  Waking at night due to asthma, or  Can do some, but not all, usual activities, or   If a peak flow meter is used, peak flow between 50 to 79% of my best     First:  Take rescue medication, and keep taking your GREEN ZONE medication(s)  Take Albuterol inhaler 4 puffs every 20 minutes for up to 1 hour, or  Take 1 vial(s) of nebulized Albuterol every 20 minutes for up to 1 hour    Second:  If your symptoms (and peak flow) return to the Green Zone 20 minutes after the last rescue treatment:  Continue the rescue medication every four hours for 1 or 2 days  Call your pulmonologist for continued symptoms despite this therapy    If your symptoms (and peak flow) do not return to the Green Zone 20 minutes after the last rescue treatment:  Take another dose of the rescue medication     If available, start oral steroid as directed on the medication bottle  Call your pulmonologist  Follow RED ZONE instructions if unable to reach your pulmonologist after 20 minutes      RED ZONE: Medical Alert!   Very short of breath, or    Trouble walking or talking due to shortness of breath, or    Lips or fingernails are blue, or  Rescue medications has not helped, or  If a peak flow meter is used, peak flow less than 50% of your best    Take these actions:  Take Albuterol inhaler 8 puffs, or  Take 2 vial(s) of nebulized Albuterol   If available, start oral steroid as directed on the medication bottle  Call 911 or go to the closest emergency room NOW  Take Albuterol inhaler 8 puffs, or 2 vial(s) of nebulized Albuterol every 20 minutes until arrival by EMS or at the ER  Call your pulmonologist      Thank you for choosing our clinic.  Please read below to learn more about contacting our office.     Normal business hours are 8 AM to 5 PM Monday through Friday.     After-Hours     If you need help quickly, please call 911 or go to the nearest emergency room. If your child is sick and you need same  day medical advice please call [phone].     For all other questions, the best way to contact us is My Chart. If do not have [email or web address], our staff can help you sign up.  [employer] messages are answered within 3 business days.     Leyden Lozada, M.D.  Pediatric Pulmonology and Sleep Staff  [facility]  Office: [phone]  Fax: [phone]

## 2022-03-10 NOTE — PROGRESS NOTES
Pediatric Pulmonology clinic  Follow up    Pop is a 21 y.o. male here for asthma follow up.    HPI/RESPIRATORY SYMPTOMS:     Pop is a 21 year old male with history of severe eczema, asthma, here for follow up. Patient was last seen by Dr. Pantoja in 6/2021. Symptoms well controlled on Dupixent alone. Ok to be off ICS.      Interval changes  No ED/hospitalizations. Denies any baseline symptoms.    Asthma Symptoms/Control:  Controllers: Dupixent every 2 weeks  How often missing/week: never  Rescue: Albuterol (Last >6 months)  Spacer use: yes  OCS course since last visit: none  Triggers: URI, weather change  His current symptoms in the last 3 months include:    Cough - 0 per week  Wheezing - 0 per week  SOB - 0 per week  He does not have nocturnal coughing when not acutely ill with respiratory illness.   Prolonged coughing with a URI (2-3 weeks duration): none.  EIB: -.     Comorbidities:  AR: denies  AD: Well controlled on Dupixent.  FA: Shellfish, has unexpired epipen.  SRBD: denies snoring, breathing pauses. + mouth breathing.  GERD: denies    SH:   Lives with girlfriend and parents    Environmental history:                    Pets in the home: none                    Hiren: tile                    Air conditioning: Good condition                    Heating: Good condition                    Mold / water damage: denies                    Tobacco smoke: denies                    Goes to school to become a  and works        Travel history: denies            Current Medications:     Current Outpatient Medications:     albuterol (PROVENTIL/VENTOLIN HFA) 90 mcg/actuation inhaler, Inhale 4 puffs into the lungs every 4 (four) hours as needed for Wheezing or Shortness of Breath (and/or persistent coughing). Rescue, Disp: 8.5 g, Rfl: 3    diphenhydrAMINE (BENADRYL) 25 mg capsule, Take 25 mg by mouth once daily. Takes one 25mg capsule every morning, Disp: , Rfl:     dupilumab (DUPIXENT PEN) 300 mg/2 mL  Thais, Inject 300 mg into the skin every 14 (fourteen) days., Disp: 4 mL, Rfl: 11    hydrocortisone 2.5 % ointment, Apply topically., Disp: , Rfl:     montelukast (SINGULAIR) 10 mg tablet, Take 1 tablet (10 mg total) by mouth every evening., Disp: 30 tablet, Rfl: 11    azelastine (ASTELIN) 137 mcg (0.1 %) nasal spray, 1 spray (137 mcg total) by Nasal route 2 (two) times daily. (Patient not taking: Reported on 6/23/2021), Disp: 30 mL, Rfl: 0    cetirizine (ZYRTEC) 10 MG tablet, Take 1 tablet (10 mg total) by mouth once daily., Disp: 30 tablet, Rfl: 2    EPINEPHrine (EPIPEN 2-BRYAN) 0.3 mg/0.3 mL AtIn, Inject 0.3 mLs (0.3 mg total) into the muscle once. for 1 dose, Disp: 2 each, Rfl: 1    naproxen (NAPROSYN) 500 MG tablet, Take 1 tablet (500 mg total) by mouth 2 (two) times daily with meals. (Patient not taking: Reported on 2/1/2022), Disp: 20 tablet, Rfl: 0    pseudoephedrine-DM-guaiFENesin (POLY-VENT DM) 60- mg Tab, Take 1 tablet by mouth every 6 (six) hours as needed. (Patient not taking: Reported on 2/1/2022), Disp: 20 tablet, Rfl: 0      PMH:   Past Medical History:   Diagnosis Date    ADHD (attention deficit hyperactivity disorder)     Allergy, unspecified not elsewhere classified     IgE 2810, immunocap positive for multiple aeroallergens    Asthma, not well controlled     Asthma, well controlled     Conjunctivitis, allergic     Conjunctivitis, allergic     Eczema     Patient non adherence     Rhinitis, allergic     Rhinitis, allergic        Patient Active Problem List   Diagnosis    Eczema    Attention deficit hyperactivity disorder (ADHD)    Allergic state    Patient non adherence    Rhinitis, allergic    Conjunctivitis, allergic    Allergy    Asthma, not well controlled    Asthma, well controlled         Past medical, family, and social history were reviewed & updated as appropriate. There are no changes unless otherwise noted.        Review of Systems   Constitutional: Negative  "for activity change, appetite change, fever and irritability.   HENT: Negative for rhinorrhea.    Eyes: Negative for discharge.   Respiratory: Negative for apnea, cough, choking, wheezing and stridor.    Cardiovascular: Negative for sweating with feeds and cyanosis.   Gastrointestinal: Negative for diarrhea and vomiting.   Genitourinary: Negative for decreased urine volume.   Musculoskeletal: Negative for joint swelling.   Integumentary:  Negative for color change and rash.   Neurological: Negative for seizures.   Hematological: Does not bruise/bleed easily.     Physical Exam    Pulse 90   Resp 16   Ht 5' 9" (1.753 m)   Wt 118.6 kg (261 lb 7.5 oz)   SpO2 98%   BMI 38.61 kg/m²     General: Patient is a well-nourished, in no apparent distress. Appears well hydrated.   Head: Normocephalic, atraumatic.  Eyes: Pupils equal, round and reactive to light. Extraocular muscles appear intact. No discharge, conjunctivitis or scleral icterus. No ptosis.   Ears: Clear external auditory canals. Pinnae normal is shape and contour. No pre-auricular pits or skin tags. TMs grey bilaterally. No erythema or bulging.   Nose: Normal pink mucosa, no discharge or blood visible. Normal midline septum.   Mouth: moist mucous membranes. Pharynx: Tonsils 1. Dickey tongue position 2. Pharynx shows no erythema or ulcerations. Normal movement of soft palate. No micrognathia or retrognathia.   Neck: Grossly non-swollen. No tracheal deviation. No decrease in ROM. No lymphadenopathy, goiter or masses detected.   Chest:  No increase of accessory muscles. Lungs are clear to auscultation bilaterally. No stridor, wheezes, crackles, or rubs. Good air movement.   CV: Regular rate and rhythm. Normal S1 with normally split S2 on respiration. No murmurs, gallops or rubs. 2+ pulses. Capillary refill less than 2 sec.   Abdomen: Soft, non-tender, non-distended. Bowel signs present. No noted splenomegaly. No masses.   Extremities: Warm, no clubbing, " cyanosis or edema.       TODAY'S LABS AND EVALUATION:    Spirometry: not available    ASSESSMENT:  Pop is a 21 year old male with history of severe eczema, asthma, here for follow up. Symptoms are well controlled.     PLAN:  Continue Dupixent as prescribed  I recommended the use of the following rescue medications for asthma exacerbation:  Albuterol 4 puffs/1 vial Q4 hrs PRN cough, wheezing or dyspnea or to begin at the first start of a URI. Patient has enough refills.  Asthma action plan, spacer provided today with demonstration and educational pamphlets provided  Avoidance of precipitants    Call or return sooner if the symptoms worsen, do not improve as expected or new symptoms or problems arise.    Thank you for allowing me to assist in the care of Pop.  Please do not hesitate to contact me if I can be of further assistance.     30 minutes of total time spent on the encounter, which includes face to face time and non-face to face time preparing to see the patient (eg, review of tests), Obtaining and/or reviewing separately obtained history, Documenting clinical information in the electronic or other health record, Independently interpreting results (not separately reported) and communicating results to the patient/family/caregiver, or Care coordination (not separately reported).    Leyden Lozada, M.D.  Pediatric Pulmonology and Sleep Medicine  Office: [phone]  Fax: [phone]  March 10, 2022       cc:    [address]  [place] [id number]

## 2022-03-23 ENCOUNTER — PATIENT MESSAGE (OUTPATIENT)
Dept: PEDIATRIC PULMONOLOGY | Facility: CLINIC | Age: 22
End: 2022-03-23
Payer: MEDICAID

## 2022-03-23 RX ORDER — ALBUTEROL SULFATE 1.25 MG/3ML
SOLUTION RESPIRATORY (INHALATION)
COMMUNITY
Start: 2022-03-23 | End: 2022-10-25

## 2022-03-23 RX ORDER — ALBUTEROL SULFATE 1.25 MG/3ML
2.5 SOLUTION RESPIRATORY (INHALATION) EVERY 4 HOURS PRN
Qty: 150 ML | Refills: 0 | Status: CANCELLED | OUTPATIENT
Start: 2022-03-23

## 2022-03-23 RX ORDER — ALBUTEROL SULFATE 0.83 MG/ML
2.5 SOLUTION RESPIRATORY (INHALATION) EVERY 4 HOURS PRN
Qty: 150 ML | Refills: 0 | Status: SHIPPED | OUTPATIENT
Start: 2022-03-23 | End: 2022-09-09 | Stop reason: SDUPTHER

## 2022-03-25 ENCOUNTER — SPECIALTY PHARMACY (OUTPATIENT)
Dept: PHARMACY | Facility: CLINIC | Age: 22
End: 2022-03-25
Payer: MEDICAID

## 2022-03-25 NOTE — TELEPHONE ENCOUNTER
Specialty Pharmacy - Refill Coordination    Specialty Medication Orders Linked to Encounter    Flowsheet Row Most Recent Value   Medication #1 dupilumab (DUPIXENT PEN) 300 mg/2 mL PnIj (Order#[id number], Rx#[id number])          Refill Questions - Documented Responses    Flowsheet Row Most Recent Value   Patient Availability and HIPAA Verification    Does patient want to proceed with activity? Yes   HIPAA/medical authority confirmed? Yes   Relationship to patient of person spoken to? Self   Refill Screening Questions    Changes to allergies? No   Changes to medications? No   New conditions since last clinic visit? No   Unplanned office visit, urgent care, ED, or hospital admission in the last 4 weeks? No   How does patient/caregiver feel medication is working? Good   Financial problems or insurance changes? No   How many doses of your specialty medications were missed in the last 4 weeks? 0   Would patient like to speak to a pharmacist? No   When does the patient need to receive the medication? 04/01/22   Refill Delivery Questions    How will the patient receive the medication? Delivery Dunia   When does the patient need to receive the medication? 04/01/22   Shipping Address Home   Address in [facility] confirmed and updated if neccessary? Yes   Expected Copay ($) 0   Is the patient able to afford the medication copay? Yes   Payment Method zero copay   Days supply of Refill 28   Supplies needed? No supplies needed   Refill activity completed? Yes   Refill activity plan Refill scheduled   Shipment/Pickup Date: 03/30/22          Current Outpatient Medications   Medication Sig    albuterol (ACCUNEB) 1.25 mg/3 mL Nebu Take by nebulization.    albuterol (PROVENTIL) 2.5 mg /3 mL (0.083 %) nebulizer solution Take 3 mLs (2.5 mg total) by nebulization every 4 (four) hours as needed for Wheezing or Shortness of Breath. Rescue    albuterol (PROVENTIL/VENTOLIN HFA) 90 mcg/actuation inhaler Inhale 4 puffs into the lungs  every 4 (four) hours as needed for Wheezing or Shortness of Breath (and/or persistent coughing). Rescue    azelastine (ASTELIN) 137 mcg (0.1 %) nasal spray 1 spray (137 mcg total) by Nasal route 2 (two) times daily. (Patient not taking: Reported on 6/23/2021)    cetirizine (ZYRTEC) 10 MG tablet Take 1 tablet (10 mg total) by mouth once daily.    diphenhydrAMINE (BENADRYL) 25 mg capsule Take 25 mg by mouth once daily. Takes one 25mg capsule every morning    dupilumab (DUPIXENT PEN) 300 mg/2 mL PnIj Inject 300 mg into the skin every 14 (fourteen) days.    EPINEPHrine (EPIPEN 2-BRYAN) 0.3 mg/0.3 mL AtIn Inject 0.3 mLs (0.3 mg total) into the muscle once. for 1 dose    hydrocortisone 2.5 % ointment Apply topically.    montelukast (SINGULAIR) 10 mg tablet Take 1 tablet (10 mg total) by mouth every evening.   Last reviewed on 3/10/2022  1:21 PM by Leyden M. Lozada-Jimenez, MD    Review of patient's allergies indicates:   Allergen Reactions    Iodine and iodide containing products     Shellfish containing products     Last reviewed on  3/23/2022 4:10 PM by Leyden M Lozada-Jimenez      Tasks added this encounter   4/22/2022 - Refill Call (Auto Added)   Tasks due within next 3 months   No tasks due.     [facility]  [address] [place] [id number]  Phone: [phone]  Fax: [phone]

## 2022-04-18 ENCOUNTER — PATIENT MESSAGE (OUTPATIENT)
Dept: ADMINISTRATIVE | Facility: OTHER | Age: 22
End: 2022-04-18
Payer: MEDICAID

## 2022-04-22 ENCOUNTER — SPECIALTY PHARMACY (OUTPATIENT)
Dept: PHARMACY | Facility: CLINIC | Age: 22
End: 2022-04-22
Payer: MEDICAID

## 2022-04-22 NOTE — TELEPHONE ENCOUNTER
Specialty Pharmacy - Refill Coordination    Specialty Medication Orders Linked to Encounter    Flowsheet Row Most Recent Value   Medication #1 dupilumab (DUPIXENT PEN) 300 mg/2 mL PnIj (Order#[id number], Rx#[id number])          Refill Questions - Documented Responses    Flowsheet Row Most Recent Value   Patient Availability and HIPAA Verification    Does patient want to proceed with activity? Yes   HIPAA/medical authority confirmed? Yes   Relationship to patient of person spoken to? Self   Refill Screening Questions    Changes to allergies? No   Changes to medications? No   New conditions since last clinic visit? No   Unplanned office visit, urgent care, ED, or hospital admission in the last 4 weeks? No   How does patient/caregiver feel medication is working? Good   Financial problems or insurance changes? No   How many doses of your specialty medications were missed in the last 4 weeks? 0   Would patient like to speak to a pharmacist? No   When does the patient need to receive the medication? 04/29/22   Refill Delivery Questions    How will the patient receive the medication? Delivery Dunia   When does the patient need to receive the medication? 04/29/22   Shipping Address Home   Address in [facility] confirmed and updated if neccessary? Yes   Expected Copay ($) 0   Is the patient able to afford the medication copay? Yes   Payment Method zero copay   Days supply of Refill 28   Supplies needed? No supplies needed   Refill activity completed? Yes   Refill activity plan Refill scheduled   Shipment/Pickup Date: 04/27/22          Current Outpatient Medications   Medication Sig    albuterol (ACCUNEB) 1.25 mg/3 mL Nebu Take by nebulization.    albuterol (PROVENTIL) 2.5 mg /3 mL (0.083 %) nebulizer solution Take 3 mLs (2.5 mg total) by nebulization every 4 (four) hours as needed for Wheezing or Shortness of Breath. Rescue    albuterol (PROVENTIL/VENTOLIN HFA) 90 mcg/actuation inhaler Inhale 4 puffs into the lungs  every 4 (four) hours as needed for Wheezing or Shortness of Breath (and/or persistent coughing). Rescue    azelastine (ASTELIN) 137 mcg (0.1 %) nasal spray 1 spray (137 mcg total) by Nasal route 2 (two) times daily. (Patient not taking: Reported on 6/23/2021)    cetirizine (ZYRTEC) 10 MG tablet Take 1 tablet (10 mg total) by mouth once daily.    diphenhydrAMINE (BENADRYL) 25 mg capsule Take 25 mg by mouth once daily. Takes one 25mg capsule every morning    dupilumab (DUPIXENT PEN) 300 mg/2 mL PnIj Inject 300 mg into the skin every 14 (fourteen) days.    EPINEPHrine (EPIPEN 2-BRYAN) 0.3 mg/0.3 mL AtIn Inject 0.3 mLs (0.3 mg total) into the muscle once. for 1 dose    hydrocortisone 2.5 % ointment Apply topically.    montelukast (SINGULAIR) 10 mg tablet Take 1 tablet (10 mg total) by mouth every evening.   Last reviewed on 3/10/2022  1:21 PM by Leyden M. Lozada-Jimenez, MD    Review of patient's allergies indicates:   Allergen Reactions    Iodine and iodide containing products     Shellfish containing products     Last reviewed on  3/23/2022 4:10 PM by Leyden M Lozada-Jimenez      Tasks added this encounter   5/20/2022 - Refill Call (Auto Added)   Tasks due within next 3 months   No tasks due.     Thalia Perez [facility] - Specialty Pharmacy  [address] [place] [id number]  Phone: [phone]  Fax: [phone]

## 2022-05-20 ENCOUNTER — SPECIALTY PHARMACY (OUTPATIENT)
Dept: PHARMACY | Facility: CLINIC | Age: 22
End: 2022-05-20
Payer: MEDICAID

## 2022-05-20 NOTE — TELEPHONE ENCOUNTER
Specialty Pharmacy - Refill Coordination    Specialty Medication Orders Linked to Encounter    Flowsheet Row Most Recent Value   Medication #1 dupilumab (DUPIXENT PEN) 300 mg/2 mL PnIj (Order#[id number], Rx#[id number])          Refill Questions - Documented Responses    Flowsheet Row Most Recent Value   Patient Availability and HIPAA Verification    Does patient want to proceed with activity? Yes   HIPAA/medical authority confirmed? Yes   Relationship to patient of person spoken to? Self   Refill Screening Questions    Changes to allergies? No   Changes to medications? No   New conditions since last clinic visit? No   Unplanned office visit, urgent care, ED, or hospital admission in the last 4 weeks? No   How does patient/caregiver feel medication is working? Good   Financial problems or insurance changes? No   How many doses of your specialty medications were missed in the last 4 weeks? 0   Would patient like to speak to a pharmacist? No   When does the patient need to receive the medication? 05/27/22   Refill Delivery Questions    How will the patient receive the medication? Delivery Dunia   When does the patient need to receive the medication? 05/27/22   Shipping Address Home   Address in [facility] confirmed and updated if neccessary? Yes   Expected Copay ($) 0   Is the patient able to afford the medication copay? Yes   Payment Method zero copay   Days supply of Refill 28   Supplies needed? No supplies needed   Refill activity completed? Yes   Refill activity plan Refill scheduled   Shipment/Pickup Date: 05/25/22          Current Outpatient Medications   Medication Sig    albuterol (ACCUNEB) 1.25 mg/3 mL Nebu Take by nebulization.    albuterol (PROVENTIL) 2.5 mg /3 mL (0.083 %) nebulizer solution Take 3 mLs (2.5 mg total) by nebulization every 4 (four) hours as needed for Wheezing or Shortness of Breath. Rescue    albuterol (PROVENTIL/VENTOLIN HFA) 90 mcg/actuation inhaler Inhale 4 puffs into the lungs  every 4 (four) hours as needed for Wheezing or Shortness of Breath (and/or persistent coughing). Rescue    azelastine (ASTELIN) 137 mcg (0.1 %) nasal spray 1 spray (137 mcg total) by Nasal route 2 (two) times daily. (Patient not taking: Reported on 6/23/2021)    cetirizine (ZYRTEC) 10 MG tablet Take 1 tablet (10 mg total) by mouth once daily.    diphenhydrAMINE (BENADRYL) 25 mg capsule Take 25 mg by mouth once daily. Takes one 25mg capsule every morning    dupilumab (DUPIXENT PEN) 300 mg/2 mL PnIj Inject 300 mg into the skin every 14 (fourteen) days.    EPINEPHrine (EPIPEN 2-BRYAN) 0.3 mg/0.3 mL AtIn Inject 0.3 mLs (0.3 mg total) into the muscle once. for 1 dose    hydrocortisone 2.5 % ointment Apply topically.    montelukast (SINGULAIR) 10 mg tablet Take 1 tablet (10 mg total) by mouth every evening.   Last reviewed on 3/10/2022  1:21 PM by Leyden M. Lozada-Jimenez, MD    Review of patient's allergies indicates:   Allergen Reactions    Iodine and iodide containing products     Shellfish containing products     Last reviewed on  3/23/2022 4:10 PM by Leyden M Lozada-Jimenez      Tasks added this encounter   No tasks added.   Tasks due within next 3 months   5/20/2022 - Refill Call (Auto Added)     Rober Perez [facility] - Specialty Pharmacy  [address] [place] [id number]  Phone: [phone]  Fax: [phone]

## 2022-06-20 ENCOUNTER — SPECIALTY PHARMACY (OUTPATIENT)
Dept: PHARMACY | Facility: CLINIC | Age: 22
End: 2022-06-20
Payer: MEDICAID

## 2022-06-20 NOTE — TELEPHONE ENCOUNTER
Specialty Pharmacy - Refill Coordination  Specialty Pharmacy - Medication/Referral Authorization    Specialty Medication Orders Linked to Encounter    Flowsheet Row Most Recent Value   Medication #1 dupilumab (DUPIXENT PEN) 300 mg/2 mL PnIj (Order#[id number], Rx#[id number])          Refill Questions - Documented Responses    Flowsheet Row Most Recent Value   Patient Availability and HIPAA Verification    Does patient want to proceed with activity? Yes   HIPAA/medical authority confirmed? Yes   Relationship to patient of person spoken to? Self   Refill Screening Questions    Changes to allergies? No   Changes to medications? No   New conditions since last clinic visit? No   Unplanned office visit, urgent care, ED, or hospital admission in the last 4 weeks? No   How does patient/caregiver feel medication is working? Very good   Financial problems or insurance changes? No   How many doses of your specialty medications were missed in the last 4 weeks? 0   Would patient like to speak to a pharmacist? No   When does the patient need to receive the medication? 06/24/22   Refill Delivery Questions    How will the patient receive the medication? Delivery Dunia   When does the patient need to receive the medication? 06/24/22   Shipping Address Home   Address in [facility] confirmed and updated if neccessary? Yes   Expected Copay ($) 0   Is the patient able to afford the medication copay? Yes   Payment Method zero copay   Days supply of Refill 28   Supplies needed? No supplies needed   Refill activity completed? Yes   Refill activity plan Refill scheduled   Shipment/Pickup Date: 06/23/22          Current Outpatient Medications   Medication Sig    albuterol (ACCUNEB) 1.25 mg/3 mL Nebu Take by nebulization.    albuterol (PROVENTIL) 2.5 mg /3 mL (0.083 %) nebulizer solution Take 3 mLs (2.5 mg total) by nebulization every 4 (four) hours as needed for Wheezing or Shortness of Breath. Rescue    albuterol (PROVENTIL/VENTOLIN  HFA) 90 mcg/actuation inhaler Inhale 4 puffs into the lungs every 4 (four) hours as needed for Wheezing or Shortness of Breath (and/or persistent coughing). Rescue    azelastine (ASTELIN) 137 mcg (0.1 %) nasal spray 1 spray (137 mcg total) by Nasal route 2 (two) times daily. (Patient not taking: Reported on 6/23/2021)    cetirizine (ZYRTEC) 10 MG tablet Take 1 tablet (10 mg total) by mouth once daily.    diphenhydrAMINE (BENADRYL) 25 mg capsule Take 25 mg by mouth once daily. Takes one 25mg capsule every morning    dupilumab (DUPIXENT PEN) 300 mg/2 mL PnIj Inject 300 mg into the skin every 14 (fourteen) days.    EPINEPHrine (EPIPEN 2-BRYAN) 0.3 mg/0.3 mL AtIn Inject 0.3 mLs (0.3 mg total) into the muscle once. for 1 dose    hydrocortisone 2.5 % ointment Apply topically.    montelukast (SINGULAIR) 10 mg tablet Take 1 tablet (10 mg total) by mouth every evening.   Last reviewed on 3/10/2022  1:21 PM by Leyden M. Lozada-Jimenez, MD    Review of patient's allergies indicates:   Allergen Reactions    Iodine and iodide containing products     Shellfish containing products     Last reviewed on  3/23/2022 4:10 PM by Leyden M Lozada-Jimenez      Tasks added this encounter   7/15/2022 - Refill Call (Auto Added)   Tasks due within next 3 months   No tasks due.     Jose Osullivan, PharmD  [facility] - Specialty Pharmacy  [address] [place] [id number]  Phone: [phone]  Fax: [phone]

## 2022-06-20 NOTE — TELEPHONE ENCOUNTER
Patient reports that he has conjunctivitis. He states that he has consulted his eye doctor, and was told to use lubricating eye drops. Patient asked if there is a way to reduce the dose of Dupixent. Advised patient that there is not.  Patient acknowledged, no other questions or concerns.     Jose Osullivan, PharmD  Clinical Pharmacist  [facility]  P: [phone]

## 2022-06-20 NOTE — TELEPHONE ENCOUNTER
PA required for dupixent. Patient due to inject 6/24. Patient also had questions on how to treat eye redness. Routing to [facility].

## 2022-06-20 NOTE — TELEPHONE ENCOUNTER
Request Reference Number: [id number]. DUPIXENT /2ML is approved through 12/20/2022. For further questions, call [facility] at [phone].

## 2022-07-15 ENCOUNTER — PATIENT MESSAGE (OUTPATIENT)
Dept: PHARMACY | Facility: CLINIC | Age: 22
End: 2022-07-15
Payer: MEDICAID

## 2022-07-18 ENCOUNTER — SPECIALTY PHARMACY (OUTPATIENT)
Dept: PHARMACY | Facility: CLINIC | Age: 22
End: 2022-07-18
Payer: MEDICAID

## 2022-07-18 ENCOUNTER — PATIENT MESSAGE (OUTPATIENT)
Dept: PHARMACY | Facility: CLINIC | Age: 22
End: 2022-07-18
Payer: MEDICAID

## 2022-07-18 NOTE — TELEPHONE ENCOUNTER
Specialty Pharmacy - Refill Coordination    Specialty Medication Orders Linked to Encounter    Flowsheet Row Most Recent Value   Medication #1 DUPIXENT SYRINGE 300 mg/2 mL Syrg (Order#[id number], Rx#[id number])          Refill Questions - Documented Responses    Flowsheet Row Most Recent Value   Patient Availability and HIPAA Verification    Does patient want to proceed with activity? Yes   HIPAA/medical authority confirmed? Yes   Relationship to patient of person spoken to? Self   Refill Screening Questions    Changes to allergies? No   Changes to medications? No   New conditions since last clinic visit? No   Unplanned office visit, urgent care, ED, or hospital admission in the last 4 weeks? No   How does patient/caregiver feel medication is working? Good   Financial problems or insurance changes? No   How many doses of your specialty medications were missed in the last 4 weeks? 0   Would patient like to speak to a pharmacist? No   When does the patient need to receive the medication? 07/22/22   Refill Delivery Questions    How will the patient receive the medication? Delivery Dunia   When does the patient need to receive the medication? 07/22/22   Shipping Address Home   Address in [facility] confirmed and updated if neccessary? Yes   Expected Copay ($) 0   Is the patient able to afford the medication copay? Yes   Payment Method zero copay   Days supply of Refill 28   Supplies needed? No supplies needed   Refill activity completed? Yes   Refill activity plan Refill scheduled   Shipment/Pickup Date: 07/19/22          Current Outpatient Medications   Medication Sig    albuterol (ACCUNEB) 1.25 mg/3 mL Nebu Take by nebulization.    albuterol (PROVENTIL) 2.5 mg /3 mL (0.083 %) nebulizer solution Take 3 mLs (2.5 mg total) by nebulization every 4 (four) hours as needed for Wheezing or Shortness of Breath. Rescue    albuterol (PROVENTIL/VENTOLIN HFA) 90 mcg/actuation inhaler Inhale 4 puffs into the lungs every 4  (four) hours as needed for Wheezing or Shortness of Breath (and/or persistent coughing). Rescue    azelastine (ASTELIN) 137 mcg (0.1 %) nasal spray 1 spray (137 mcg total) by Nasal route 2 (two) times daily. (Patient not taking: Reported on 6/23/2021)    cetirizine (ZYRTEC) 10 MG tablet Take 1 tablet (10 mg total) by mouth once daily.    diphenhydrAMINE (BENADRYL) 25 mg capsule Take 25 mg by mouth once daily. Takes one 25mg capsule every morning    dupilumab (DUPIXENT PEN) 300 mg/2 mL PnIj Inject 300 mg into the skin every 14 (fourteen) days.    EPINEPHrine (EPIPEN 2-BRYAN) 0.3 mg/0.3 mL AtIn Inject 0.3 mLs (0.3 mg total) into the muscle once. for 1 dose    hydrocortisone 2.5 % ointment Apply topically.    montelukast (SINGULAIR) 10 mg tablet Take 1 tablet (10 mg total) by mouth every evening.   Last reviewed on 3/10/2022  1:21 PM by Leyden M. Lozada-Jimenez, MD    Review of patient's allergies indicates:   Allergen Reactions    Iodine and iodide containing products     Shellfish containing products     Last reviewed on  3/23/2022 4:10 PM by Leyden M Lozada-Jimenez      Tasks added this encounter   No tasks added.   Tasks due within next 3 months   7/15/2022 - Refill Call (Auto Added)     Agatha Singh, PharmD  [facility]  [address] [place] [id number]  Phone: [phone]  Fax: [phone]

## 2022-08-12 ENCOUNTER — SPECIALTY PHARMACY (OUTPATIENT)
Dept: PHARMACY | Facility: CLINIC | Age: 22
End: 2022-08-12
Payer: MEDICAID

## 2022-08-12 NOTE — TELEPHONE ENCOUNTER
Specialty Pharmacy - Refill Coordination    Specialty Medication Orders Linked to Encounter    Flowsheet Row Most Recent Value   Medication #1 dupilumab (DUPIXENT PEN) 300 mg/2 mL PnIj (Order#[id number], Rx#[id number])          Refill Questions - Documented Responses    Flowsheet Row Most Recent Value   Patient Availability and HIPAA Verification    Does patient want to proceed with activity? Yes   HIPAA/medical authority confirmed? Yes   Relationship to patient of person spoken to? Self   Refill Screening Questions    Changes to allergies? No   Changes to medications? No   New conditions since last clinic visit? No   Unplanned office visit, urgent care, ED, or hospital admission in the last 4 weeks? No   How does patient/caregiver feel medication is working? Good   Financial problems or insurance changes? No   How many doses of your specialty medications were missed in the last 4 weeks? 0   Would patient like to speak to a pharmacist? No   When does the patient need to receive the medication? 08/19/22   Refill Delivery Questions    How will the patient receive the medication? Delivery Dunia   When does the patient need to receive the medication? 08/19/22   Shipping Address Home   Address in [facility] confirmed and updated if neccessary? Yes   Expected Copay ($) 0   Is the patient able to afford the medication copay? Yes   Payment Method zero copay   Days supply of Refill 28   Supplies needed? No supplies needed   Refill activity completed? Yes   Refill activity plan Refill scheduled   Shipment/Pickup Date: 08/15/22          Current Outpatient Medications   Medication Sig    albuterol (ACCUNEB) 1.25 mg/3 mL Nebu Take by nebulization.    albuterol (PROVENTIL) 2.5 mg /3 mL (0.083 %) nebulizer solution Take 3 mLs (2.5 mg total) by nebulization every 4 (four) hours as needed for Wheezing or Shortness of Breath. Rescue    albuterol (PROVENTIL/VENTOLIN HFA) 90 mcg/actuation inhaler Inhale 4 puffs into the lungs  every 4 (four) hours as needed for Wheezing or Shortness of Breath (and/or persistent coughing). Rescue    azelastine (ASTELIN) 137 mcg (0.1 %) nasal spray 1 spray (137 mcg total) by Nasal route 2 (two) times daily. (Patient not taking: Reported on 6/23/2021)    cetirizine (ZYRTEC) 10 MG tablet Take 1 tablet (10 mg total) by mouth once daily.    diphenhydrAMINE (BENADRYL) 25 mg capsule Take 25 mg by mouth once daily. Takes one 25mg capsule every morning    dupilumab (DUPIXENT PEN) 300 mg/2 mL PnIj Inject 300 mg into the skin every 14 (fourteen) days.    EPINEPHrine (EPIPEN 2-BRYAN) 0.3 mg/0.3 mL AtIn Inject 0.3 mLs (0.3 mg total) into the muscle once. for 1 dose    hydrocortisone 2.5 % ointment Apply topically.    montelukast (SINGULAIR) 10 mg tablet Take 1 tablet (10 mg total) by mouth every evening.   Last reviewed on 3/10/2022  1:21 PM by Leyden M. Lozada-Jimenez, MD    Review of patient's allergies indicates:   Allergen Reactions    Iodine and iodide containing products     Shellfish containing products     Last reviewed on  3/23/2022 4:10 PM by Leyden M Lozada-Jimenez      Tasks added this encounter   9/9/2022 - Refill Call (Auto Added)   Tasks due within next 3 months   No tasks due.     [facility]  [address] [place] [id number]  Phone: [phone]  Fax: [phone]

## 2022-08-23 ENCOUNTER — TELEPHONE (OUTPATIENT)
Dept: PEDIATRIC PULMONOLOGY | Facility: CLINIC | Age: 22
End: 2022-08-23
Payer: MEDICAID

## 2022-09-09 ENCOUNTER — TELEPHONE (OUTPATIENT)
Dept: PEDIATRIC PULMONOLOGY | Facility: CLINIC | Age: 22
End: 2022-09-09
Payer: MEDICAID

## 2022-09-09 ENCOUNTER — SPECIALTY PHARMACY (OUTPATIENT)
Dept: PHARMACY | Facility: CLINIC | Age: 22
End: 2022-09-09
Payer: MEDICAID

## 2022-09-09 ENCOUNTER — PATIENT MESSAGE (OUTPATIENT)
Dept: PEDIATRIC PULMONOLOGY | Facility: CLINIC | Age: 22
End: 2022-09-09
Payer: MEDICAID

## 2022-09-09 DIAGNOSIS — J45.50 SEVERE PERSISTENT ASTHMA, UNSPECIFIED WHETHER COMPLICATED: ICD-10-CM

## 2022-09-09 DIAGNOSIS — R05.9 COUGH: ICD-10-CM

## 2022-09-09 RX ORDER — ALBUTEROL SULFATE 90 UG/1
4 AEROSOL, METERED RESPIRATORY (INHALATION) EVERY 4 HOURS PRN
Qty: 8.5 G | Refills: 3 | Status: SHIPPED | OUTPATIENT
Start: 2022-09-09 | End: 2022-10-25 | Stop reason: SDUPTHER

## 2022-09-09 RX ORDER — ALBUTEROL SULFATE 0.83 MG/ML
2.5 SOLUTION RESPIRATORY (INHALATION) EVERY 4 HOURS PRN
Qty: 150 ML | Refills: 0 | Status: SHIPPED | OUTPATIENT
Start: 2022-09-09 | End: 2022-10-25

## 2022-09-09 RX ORDER — MONTELUKAST SODIUM 10 MG/1
10 TABLET ORAL NIGHTLY
Qty: 30 TABLET | Refills: 11 | Status: SHIPPED | OUTPATIENT
Start: 2022-09-09 | End: 2022-10-25 | Stop reason: SDUPTHER

## 2022-09-09 NOTE — TELEPHONE ENCOUNTER
Attempted to call patient about follow up appointment. LVM with a call back number. Sent [employer] message.

## 2022-09-09 NOTE — TELEPHONE ENCOUNTER
Specialty Pharmacy - Refill Coordination    Specialty Medication Orders Linked to Encounter      Flowsheet Row Most Recent Value   Medication #1 dupilumab (DUPIXENT PEN) 300 mg/2 mL PnIj (Order#[id number], Rx#[id number])            Refill Questions - Documented Responses      Flowsheet Row Most Recent Value   Patient Availability and HIPAA Verification    Does patient want to proceed with activity? Yes   HIPAA/medical authority confirmed? Yes   Relationship to patient of person spoken to? Self   Refill Screening Questions    Changes to allergies? No   Changes to medications? No   New conditions since last clinic visit? No   Unplanned office visit, urgent care, ED, or hospital admission in the last 4 weeks? No   How does patient/caregiver feel medication is working? Very good   Financial problems or insurance changes? No   How many doses of your specialty medications were missed in the last 4 weeks? 0   Would patient like to speak to a pharmacist? No   When does the patient need to receive the medication? 09/16/22   Refill Delivery Questions    How will the patient receive the medication? Delivery [place]   When does the patient need to receive the medication? 09/16/22   Shipping Address Home   Address in [facility] confirmed and updated if neccessary? Yes   Expected Copay ($) 0   Is the patient able to afford the medication copay? Yes   Payment Method zero copay   Days supply of Refill 28   Supplies needed? No supplies needed   Refill activity completed? Yes   Refill activity plan Refill scheduled   Shipment/Pickup Date: 09/13/22            Current Outpatient Medications   Medication Sig    albuterol (ACCUNEB) 1.25 mg/3 mL Nebu Take by nebulization.    albuterol (PROVENTIL) 2.5 mg /3 mL (0.083 %) nebulizer solution Take 3 mLs (2.5 mg total) by nebulization every 4 (four) hours as needed for Wheezing or Shortness of Breath. Rescue    albuterol (PROVENTIL/VENTOLIN HFA) 90 mcg/actuation inhaler Inhale 4 puffs into  the lungs every 4 (four) hours as needed for Wheezing or Shortness of Breath (and/or persistent coughing). Rescue    azelastine (ASTELIN) 137 mcg (0.1 %) nasal spray 1 spray (137 mcg total) by Nasal route 2 (two) times daily. (Patient not taking: Reported on 6/23/2021)    cetirizine (ZYRTEC) 10 MG tablet Take 1 tablet (10 mg total) by mouth once daily.    diphenhydrAMINE (BENADRYL) 25 mg capsule Take 25 mg by mouth once daily. Takes one 25mg capsule every morning    dupilumab (DUPIXENT PEN) 300 mg/2 mL PnIj Inject 300 mg into the skin every 14 (fourteen) days.    EPINEPHrine (EPIPEN 2-BRYAN) 0.3 mg/0.3 mL AtIn Inject 0.3 mLs (0.3 mg total) into the muscle once. for 1 dose    hydrocortisone 2.5 % ointment Apply topically.    montelukast (SINGULAIR) 10 mg tablet Take 1 tablet (10 mg total) by mouth every evening.   Last reviewed on 3/10/2022  1:21 PM by Leyden M. Lozada-Jimenez, MD    Review of patient's allergies indicates:   Allergen Reactions    Iodine and iodide containing products     Shellfish containing products     Last reviewed on  3/23/2022 4:10 PM by Leyden M Lozada-Jimenez      Tasks added this encounter   10/7/2022 - Refill Call (Auto Added)   Tasks due within next 3 months   No tasks due.     Josiane Zimmer, PharmD  [facility]  [address] [place] [id number]  Phone: [phone]  Fax: [phone]

## 2022-09-09 NOTE — TELEPHONE ENCOUNTER
----- Message from Lisha Partida RN sent at 9/9/2022  2:09 PM CDT -----  Moi Goel, I was able to move him to 10/25 if you can call to confirm please.    Thank you!  Lisha   ----- Message -----  From: Christine Clemente MA  Sent: 9/9/2022   2:03 PM CDT  To: LORRIE Garnett are you able to schedule this patient? Its not allowing me to schedule him either.     Thanks,  Manasa   ----- Message -----  From: Leyden M. Lozada-Jimenez, MD  Sent: 9/9/2022  12:39 PM CDT  To: Elma Helm LPN, #    Thank you Elma, just sent refills. Team can you add him to my next available pulm visit? Thanks.    Leyden  ----- Message -----  From: Elma Helm LPN  Sent: 9/9/2022  12:21 PM CDT  To: Leyden M. Lozada-Jimenez, MD    Good afternoon Dr. Leyden,    I spoke with mom. She stated that Huy isn't having any issues and that he is only in need of his refills. She wanted to know if you would be able to refill his scripts and schedule them at your first available. I tried to reschedule him but he's an adult patient so it's given me an error. Please advise.

## 2022-09-09 NOTE — TELEPHONE ENCOUNTER
Called to reschedule 9/15/22 appointment with Dr. Patel. No answer. Left voicemail to return call to clinic at their earliest convenience to reschedule this appointment..

## 2022-10-07 ENCOUNTER — SPECIALTY PHARMACY (OUTPATIENT)
Dept: PHARMACY | Facility: CLINIC | Age: 22
End: 2022-10-07
Payer: MEDICAID

## 2022-10-07 NOTE — TELEPHONE ENCOUNTER
Specialty Pharmacy - Refill Coordination    Specialty Medication Orders Linked to Encounter      Flowsheet Row Most Recent Value   Medication #1 dupilumab (DUPIXENT PEN) 300 mg/2 mL PnIj (Order#[id number], Rx#[id number])            Refill Questions - Documented Responses      Flowsheet Row Most Recent Value   Patient Availability and HIPAA Verification    Does patient want to proceed with activity? Yes   HIPAA/medical authority confirmed? Yes   Relationship to patient of person spoken to? Self   Refill Screening Questions    Changes to allergies? No   Changes to medications? No   New conditions since last clinic visit? No   Unplanned office visit, urgent care, ED, or hospital admission in the last 4 weeks? No   How does patient/caregiver feel medication is working? Good   Financial problems or insurance changes? No   How many doses of your specialty medications were missed in the last 4 weeks? 0   Would patient like to speak to a pharmacist? No   When does the patient need to receive the medication? 10/14/22   Refill Delivery Questions    How will the patient receive the medication? MEDRx   When does the patient need to receive the medication? 10/14/22   Shipping Address Home   Address in [facility] confirmed and updated if neccessary? Yes   Expected Copay ($) 0   Is the patient able to afford the medication copay? Yes   Payment Method zero copay   Days supply of Refill 28   Supplies needed? No supplies needed   Refill activity completed? Yes   Refill activity plan Refill scheduled   Shipment/Pickup Date: 10/11/22            Current Outpatient Medications   Medication Sig    albuterol (ACCUNEB) 1.25 mg/3 mL Nebu Take by nebulization.    albuterol (PROVENTIL) 2.5 mg /3 mL (0.083 %) nebulizer solution Take 3 mLs (2.5 mg total) by nebulization every 4 (four) hours as needed for Wheezing or Shortness of Breath. Rescue    albuterol (PROVENTIL/VENTOLIN HFA) 90 mcg/actuation inhaler Inhale 4 puffs into the lungs  every 4 (four) hours as needed for Wheezing or Shortness of Breath (and/or persistent coughing). Rescue    azelastine (ASTELIN) 137 mcg (0.1 %) nasal spray 1 spray (137 mcg total) by Nasal route 2 (two) times daily. (Patient not taking: Reported on 6/23/2021)    cetirizine (ZYRTEC) 10 MG tablet Take 1 tablet (10 mg total) by mouth once daily.    diphenhydrAMINE (BENADRYL) 25 mg capsule Take 25 mg by mouth once daily. Takes one 25mg capsule every morning    dupilumab (DUPIXENT PEN) 300 mg/2 mL PnIj Inject 300 mg into the skin every 14 (fourteen) days.    EPINEPHrine (EPIPEN 2-BRYAN) 0.3 mg/0.3 mL AtIn Inject 0.3 mLs (0.3 mg total) into the muscle once. for 1 dose    hydrocortisone 2.5 % ointment Apply topically.    montelukast (SINGULAIR) 10 mg tablet Take 1 tablet (10 mg total) by mouth every evening.   Last reviewed on 3/10/2022  1:21 PM by Leyden M. Lozada-Jimenez, MD    Review of patient's allergies indicates:   Allergen Reactions    Iodine and iodide containing products     Shellfish containing products     Last reviewed on  9/9/2022 12:38 PM by Leyden M Lozada-Jimenez      Tasks added this encounter   11/4/2022 - Refill Call (Auto Added)   Tasks due within next 3 months   No tasks due.     [facility]  [address] [place] [id number]  Phone: [phone]  Fax: [phone]

## 2022-10-24 ENCOUNTER — TELEPHONE (OUTPATIENT)
Dept: PEDIATRIC PULMONOLOGY | Facility: CLINIC | Age: 22
End: 2022-10-24
Payer: MEDICAID

## 2022-10-25 ENCOUNTER — OFFICE VISIT (OUTPATIENT)
Dept: PEDIATRIC PULMONOLOGY | Facility: CLINIC | Age: 22
End: 2022-10-25
Payer: MEDICAID

## 2022-10-25 VITALS
OXYGEN SATURATION: 98 % | HEIGHT: 71 IN | HEART RATE: 76 BPM | WEIGHT: 257.69 LBS | RESPIRATION RATE: 20 BRPM | BODY MASS INDEX: 36.08 KG/M2

## 2022-10-25 DIAGNOSIS — G47.30 SLEEP-DISORDERED BREATHING: ICD-10-CM

## 2022-10-25 DIAGNOSIS — J30.9 ALLERGIC RHINITIS, UNSPECIFIED SEASONALITY, UNSPECIFIED TRIGGER: ICD-10-CM

## 2022-10-25 DIAGNOSIS — H10.10 ALLERGIC CONJUNCTIVITIS, UNSPECIFIED LATERALITY: ICD-10-CM

## 2022-10-25 DIAGNOSIS — J45.50 SEVERE PERSISTENT ASTHMA, UNSPECIFIED WHETHER COMPLICATED: ICD-10-CM

## 2022-10-25 DIAGNOSIS — H01.119 CONTACT AND ALLERGIC DERMATITIS OF EYELID: ICD-10-CM

## 2022-10-25 DIAGNOSIS — R06.83 SNORING: Primary | ICD-10-CM

## 2022-10-25 DIAGNOSIS — T78.40XS ALLERGY, SEQUELA: ICD-10-CM

## 2022-10-25 DIAGNOSIS — R05.3 CHRONIC COUGH: ICD-10-CM

## 2022-10-25 DIAGNOSIS — J45.901 NOT WELL CONTROLLED ASTHMA WITH ACUTE EXACERBATION, UNSPECIFIED ASTHMA SEVERITY, UNSPECIFIED WHETHER PERSISTENT: ICD-10-CM

## 2022-10-25 LAB
CTP QC/QA: YES
SARS-COV-2 RDRP RESP QL NAA+PROBE: NEGATIVE

## 2022-10-25 PROCEDURE — 1159F MED LIST DOCD IN RCRD: CPT | Mod: CPTII,,, | Performed by: GENERAL ACUTE CARE HOSPITAL

## 2022-10-25 PROCEDURE — 94010 BREATHING CAPACITY TEST: CPT | Mod: 26,S$PBB,, | Performed by: GENERAL ACUTE CARE HOSPITAL

## 2022-10-25 PROCEDURE — 99214 PR OFFICE/OUTPT VISIT, EST, LEVL IV, 30-39 MIN: ICD-10-PCS | Mod: 25,S$PBB,, | Performed by: GENERAL ACUTE CARE HOSPITAL

## 2022-10-25 PROCEDURE — 99999 PR PBB SHADOW E&M-EST. PATIENT-LVL III: ICD-10-PCS | Mod: PBBFAC,,, | Performed by: GENERAL ACUTE CARE HOSPITAL

## 2022-10-25 PROCEDURE — 94010 BREATHING CAPACITY TEST: ICD-10-PCS | Mod: 26,S$PBB,, | Performed by: GENERAL ACUTE CARE HOSPITAL

## 2022-10-25 PROCEDURE — 99999 PR PBB SHADOW E&M-EST. PATIENT-LVL III: CPT | Mod: PBBFAC,,, | Performed by: GENERAL ACUTE CARE HOSPITAL

## 2022-10-25 PROCEDURE — 99214 OFFICE O/P EST MOD 30 MIN: CPT | Mod: 25,S$PBB,, | Performed by: GENERAL ACUTE CARE HOSPITAL

## 2022-10-25 PROCEDURE — 1159F PR MEDICATION LIST DOCUMENTED IN MEDICAL RECORD: ICD-10-PCS | Mod: CPTII,,, | Performed by: GENERAL ACUTE CARE HOSPITAL

## 2022-10-25 PROCEDURE — 87635 SARS-COV-2 COVID-19 AMP PRB: CPT | Mod: PBBFAC | Performed by: GENERAL ACUTE CARE HOSPITAL

## 2022-10-25 PROCEDURE — 94010 BREATHING CAPACITY TEST: CPT | Mod: PBBFAC | Performed by: GENERAL ACUTE CARE HOSPITAL

## 2022-10-25 PROCEDURE — 99213 OFFICE O/P EST LOW 20 MIN: CPT | Mod: PBBFAC,25 | Performed by: GENERAL ACUTE CARE HOSPITAL

## 2022-10-25 RX ORDER — MONTELUKAST SODIUM 10 MG/1
10 TABLET ORAL NIGHTLY
Qty: 30 TABLET | Refills: 11 | Status: SHIPPED | OUTPATIENT
Start: 2022-10-25 | End: 2023-02-08 | Stop reason: SDUPTHER

## 2022-10-25 RX ORDER — DEXAMETHASONE 4 MG/1
TABLET ORAL
COMMUNITY
Start: 2022-06-02 | End: 2023-02-08

## 2022-10-25 RX ORDER — OLOPATADINE HYDROCHLORIDE 1 MG/ML
1 SOLUTION/ DROPS OPHTHALMIC 2 TIMES DAILY
Qty: 5 ML | Refills: 3 | Status: SHIPPED | OUTPATIENT
Start: 2022-10-25 | End: 2023-02-08 | Stop reason: SDUPTHER

## 2022-10-25 RX ORDER — CETIRIZINE HYDROCHLORIDE 10 MG/1
10 TABLET ORAL DAILY
Qty: 30 TABLET | Refills: 2 | Status: SHIPPED | OUTPATIENT
Start: 2022-10-25 | End: 2023-02-08 | Stop reason: SDUPTHER

## 2022-10-25 RX ORDER — CHLORHEXIDINE GLUCONATE ORAL RINSE 1.2 MG/ML
SOLUTION DENTAL
COMMUNITY
Start: 2022-10-13

## 2022-10-25 RX ORDER — ALBUTEROL SULFATE 90 UG/1
4 AEROSOL, METERED RESPIRATORY (INHALATION) EVERY 4 HOURS PRN
Qty: 8.5 G | Refills: 3 | Status: SHIPPED | OUTPATIENT
Start: 2022-10-25 | End: 2023-02-08 | Stop reason: SDUPTHER

## 2022-10-25 NOTE — PROGRESS NOTES
Pediatric Pulmonology clinic  Follow up    Pop is a 21 y.o. male here for asthma follow up.    HPI/RESPIRATORY SYMPTOMS:     The patient's last visit with me was on 3/10/2022.    Pop is a 21 year old male with history of severe eczema, asthma, here for follow up. Symptoms are well controlled.     PLAN:  Continue Dupixent as prescribed  I recommended the use of the following rescue medications for asthma exacerbation:  Albuterol 4 puffs/1 vial Q4 hrs PRN cough, wheezing or dyspnea or to begin at the first start of a URI. Patient has enough refills.  Asthma action plan, spacer provided today with demonstration and educational pamphlets provided  Avoidance of precipitants      Interval changes  No ED/hospitalizations. Denies any baseline symptoms.    Asthma Symptoms/Control:  Controllers: Dupixent every 2 weeks  How often missing/week: never  Rescue: Albuterol (Last >6 months)  Spacer use: yes  OCS course since last visit: none  Triggers: URI, weather change  His current symptoms in the last 3 months include:    Cough - 0 per week  Wheezing - 0 per week  SOB - 0 per week  He does not have nocturnal coughing when not acutely ill with respiratory illness.   Prolonged coughing with a URI (2-3 weeks duration): none.  EIB: -.     Comorbidities:  AR: denies  AD: Not well controlled on Dupixent. Does not have a consistent skin regimen. Uses cerave lotion prn. Has dermatology follow up.  FA: Shellfish, has unexpired epipen.  SRBD: denies snoring, breathing pauses. + mouth breathing.  GERD: denies    SH:   Lives with girlfriend and parents    Environmental history:                    Pets in the home: none                    Hiren: tile                    Air conditioning: Good condition                    Heating: Good condition                    Mold / water damage: denies                    Tobacco smoke: denies                    Goes to school to become a  and works        Travel history: denies             Current Medications:     Current Outpatient Medications:     cetirizine (ZYRTEC) 10 MG tablet, Take 1 tablet (10 mg total) by mouth once daily., Disp: 30 tablet, Rfl: 2    chlorhexidine (PERIDEX) 0.12 % solution, SMARTSIG:By Mouth, Disp: , Rfl:     diphenhydrAMINE (BENADRYL) 25 mg capsule, Take 25 mg by mouth once daily. Takes one 25mg capsule every morning, Disp: , Rfl:     dupilumab (DUPIXENT PEN) 300 mg/2 mL PnIj, Inject 300 mg into the skin every 14 (fourteen) days., Disp: 4 mL, Rfl: 11    hydrocortisone 2.5 % ointment, Apply topically., Disp: , Rfl:     montelukast (SINGULAIR) 10 mg tablet, Take 1 tablet (10 mg total) by mouth every evening., Disp: 30 tablet, Rfl: 11    albuterol (ACCUNEB) 1.25 mg/3 mL Nebu, Take by nebulization., Disp: , Rfl:     albuterol (PROVENTIL) 2.5 mg /3 mL (0.083 %) nebulizer solution, Take 3 mLs (2.5 mg total) by nebulization every 4 (four) hours as needed for Wheezing or Shortness of Breath. Rescue (Patient not taking: Reported on 10/25/2022), Disp: 150 mL, Rfl: 0    albuterol (PROVENTIL/VENTOLIN HFA) 90 mcg/actuation inhaler, Inhale 4 puffs into the lungs every 4 (four) hours as needed for Wheezing or Shortness of Breath (and/or persistent coughing). Rescue (Patient not taking: Reported on 10/25/2022), Disp: 8.5 g, Rfl: 3    azelastine (ASTELIN) 137 mcg (0.1 %) nasal spray, 1 spray (137 mcg total) by Nasal route 2 (two) times daily. (Patient not taking: No sig reported), Disp: 30 mL, Rfl: 0    EPINEPHrine (EPIPEN 2-BRYAN) 0.3 mg/0.3 mL AtIn, Inject 0.3 mLs (0.3 mg total) into the muscle once. for 1 dose, Disp: 2 each, Rfl: 1    FLOVENT  mcg/actuation inhaler, , Disp: , Rfl:       PMH:   Past Medical History:   Diagnosis Date    ADHD (attention deficit hyperactivity disorder)     Allergy, unspecified not elsewhere classified     IgE 2810, immunocap positive for multiple aeroallergens    Asthma, not well controlled     Asthma, well controlled     Conjunctivitis, allergic      "Conjunctivitis, allergic     Eczema     Patient non adherence     Rhinitis, allergic     Rhinitis, allergic        Patient Active Problem List   Diagnosis    Eczema    Attention deficit hyperactivity disorder (ADHD)    Allergic state    Patient non adherence    Rhinitis, allergic    Conjunctivitis, allergic    Allergy    Asthma, not well controlled    Asthma, well controlled         Past medical, family, and social history were reviewed & updated as appropriate. There are no changes unless otherwise noted.        Review of Systems   Constitutional: Negative for activity change, appetite change, fever and irritability.   HENT: Negative for rhinorrhea.    Eyes: Negative for discharge.   Respiratory: Negative for apnea, cough, choking, wheezing and stridor.    Cardiovascular: Negative for sweating with feeds and cyanosis.   Gastrointestinal: Negative for diarrhea and vomiting.   Genitourinary: Negative for decreased urine volume.   Musculoskeletal: Negative for joint swelling.   Integumentary:  Negative for color change and rash.   Neurological: Negative for seizures.   Hematological: Does not bruise/bleed easily.     Physical Exam    Pulse 76   Resp 20   Ht 5' 11.26" (1.81 m)   Wt 116.9 kg (257 lb 11.5 oz)   SpO2 98%   BMI 35.68 kg/m²     General: Patient is a well-nourished, in no apparent distress. Appears well hydrated.   Head: Normocephalic, atraumatic.  Eyes: Pupils equal, round and reactive to light. Extraocular muscles appear intact. No discharge, conjunctivitis or scleral icterus. No ptosis.   Ears: Clear external auditory canals. Pinnae normal is shape and contour. No pre-auricular pits or skin tags. TMs grey bilaterally. No erythema or bulging.   Nose: Normal pink mucosa, no discharge or blood visible. Normal midline septum.   Mouth: moist mucous membranes. Pharynx: Tonsils 1. Dickey tongue position 2. Pharynx shows no erythema or ulcerations. Normal movement of soft palate. No micrognathia or " retrognathia.   Neck: Grossly non-swollen. No tracheal deviation. No decrease in ROM. No lymphadenopathy, goiter or masses detected.   Chest:  No increase of accessory muscles. Lungs are clear to auscultation bilaterally. No stridor, wheezes, crackles, or rubs. Good air movement.   CV: Regular rate and rhythm. Normal S1 with normally split S2 on respiration. No murmurs, gallops or rubs. 2+ pulses. Capillary refill less than 2 sec.   Abdomen: Soft, non-tender, non-distended. Bowel signs present. No noted splenomegaly. No masses.   Extremities: Warm, no clubbing, cyanosis or edema.       TODAY'S LABS AND EVALUATION:    Spirometry today: normal    ASSESSMENT:  Pop is a 21 year old male with history of severe eczema, asthma, here for follow up. Symptoms are well controlled.     PLAN:  Continue Dupixent as prescribed  I recommended the use of the following rescue medications for asthma exacerbation:  Albuterol 4 puffs/1 vial Q4 hrs PRN cough, wheezing or dyspnea or to begin at the first start of a URI. Patient has enough refills.  Asthma action plan, spacer provided today with demonstration and educational pamphlets provided  Avoidance of precipitants  HSAT to monitor for sleep disordered breathing symptoms.     Call or return sooner if the symptoms worsen, do not improve as expected or new symptoms or problems arise.    Thank you for allowing me to assist in the care of Pop.  Please do not hesitate to contact me if I can be of further assistance.     30 minutes of total time spent on the encounter, which includes face to face time and non-face to face time preparing to see the patient (eg, review of tests), Obtaining and/or reviewing separately obtained history, Documenting clinical information in the electronic or other health record, Independently interpreting results (not separately reported) and communicating results to the patient/family/caregiver, or Care coordination (not separately reported).    Leyden  DEVIN Montiel.  Pediatric Pulmonology and Sleep Medicine  Office: [phone]  Fax: [phone]  October 25, 2022       cc:    [address] [place], [place] [address] [id number]    Addendum  Discussed performing HSAT instead of in lab study. I will be able to interpret results.     Leyden M. Lozada-Jimenez

## 2022-10-25 NOTE — PATIENT INSTRUCTIONS
Summary    1. Asthma    Continue Dupixent as prescribed    Continue the following rescue medications:  -Albuterol MDI 4 puffs every 4 hours as needed with spacer     2.Asthma action plan and spacer education provided    3. Keep appointment with dermatology    4. Sleep study    A sleep study(Polysomnogram) has been ordered to evaluate for obstructive sleep apnea and other sleep related disorders.     What to expect  -You and your child will sleep in a bed at the sleep center.  -Plan to get to the sleep center at 7:30 PM.  -Once you and your child are settled at the sleep lab, a nasal cannula and other sensors will be placed on your child in different areas, such as on the head, chin, and legs, and around the eyes. In addition, an elastic belt will be placed around your child's chest and stomach to measure breathing.  -After your sleep study is completed, a follow up appointment will be scheduled with your sleep provider in 2 weeks to discuss the results and next steps.  -Covid testing prior to be done in 72 hours prior to the study    If you have any questions or wish to reschedule your sleep study appointment, please contact [facility] at [phone].    [facility] [address] [place] [address]      Follow up in 4 months              Asthma Action Plan for Pop Lawler     Pulmonologist:  Dr. Leyden Lozada  Contact number:  [phone]     Rescue medication:  Albuterol  Control medication(s): Dupixent     Please bring this plan and all your medications to each visit to our office or the emergency room.    GREEN ZONE: Doing Well   No cough, wheeze, chest tightness or shortness of breath during the day or night  Can do your usual activities  If a peak flow meter is used, peak flow 80% or more of my best    Take this medication each day   Medicine How much to take When to take it   Dupixent  Every 2 weeks                           Take this medication before  exercise if your asthma is exercise-induced   Medicine How much to take When to take it   Albuterol 2 puffs 15 minutes before exercise            YELLOW ZONE: Asthma is Getting Worse   Cough, wheeze, chest tightness or shortness of breath or  Waking at night due to asthma, or  Can do some, but not all, usual activities, or   If a peak flow meter is used, peak flow between 50 to 79% of my best     First:  Take rescue medication, and keep taking your GREEN ZONE medication(s)  Take Albuterol inhaler 4 puffs every 20 minutes for up to 1 hour, or  Take 1 vial(s) of nebulized Albuterol every 20 minutes for up to 1 hour    Second:  If your symptoms (and peak flow) return to the Green Zone 20 minutes after the last rescue treatment:  Continue the rescue medication every four hours for 1 or 2 days  Call your pulmonologist for continued symptoms despite this therapy    If your symptoms (and peak flow) do not return to the Green Zone 20 minutes after the last rescue treatment:  Take another dose of the rescue medication     If available, start oral steroid as directed on the medication bottle  Call your pulmonologist  Follow RED ZONE instructions if unable to reach your pulmonologist after 20 minutes      RED ZONE: Medical Alert!   Very short of breath, or    Trouble walking or talking due to shortness of breath, or    Lips or fingernails are blue, or  Rescue medications has not helped, or  If a peak flow meter is used, peak flow less than 50% of your best    Take these actions:  Take Albuterol inhaler 8 puffs, or  Take 2 vial(s) of nebulized Albuterol   If available, start oral steroid as directed on the medication bottle  Call 911 or go to the closest emergency room NOW  Take Albuterol inhaler 8 puffs, or 2 vial(s) of nebulized Albuterol every 20 minutes until arrival by EMS or at the ER  Call your pulmonologist      Thank you for choosing our clinic.  Please read below to learn more about contacting our office.     Normal  business hours are 8 AM to 5 PM Monday through Friday.     After-Hours     If you need help quickly, please call 911 or go to the nearest emergency room. If your child is sick and you need same day medical advice please call [phone].     For all other questions, the best way to contact us is My Chart. If do not have [employer], our staff can help you sign up.  Digital [employer] messages are answered within 3 business days.     Leyden Lozada, M.D.  Pediatric Pulmonology and Sleep Staff  [facility]  Office: [phone]  Fax: [phone]

## 2022-10-26 ENCOUNTER — TELEPHONE (OUTPATIENT)
Dept: SLEEP MEDICINE | Facility: OTHER | Age: 22
End: 2022-10-26
Payer: MEDICAID

## 2022-11-03 PROBLEM — R06.83 SNORING: Status: ACTIVE | Noted: 2022-11-03

## 2022-11-03 PROBLEM — G47.30 SLEEP-DISORDERED BREATHING: Status: ACTIVE | Noted: 2022-11-03

## 2022-11-04 ENCOUNTER — SPECIALTY PHARMACY (OUTPATIENT)
Dept: PHARMACY | Facility: CLINIC | Age: 22
End: 2022-11-04
Payer: MEDICAID

## 2022-11-04 NOTE — TELEPHONE ENCOUNTER
Specialty Pharmacy - Refill Coordination    Specialty Medication Orders Linked to Encounter      Flowsheet Row Most Recent Value   Medication #1 dupilumab (DUPIXENT PEN) 300 mg/2 mL PnIj (Order#[id number], Rx#[id number])            Refill Questions - Documented Responses      Flowsheet Row Most Recent Value   Patient Availability and HIPAA Verification    Does patient want to proceed with activity? Yes   HIPAA/medical authority confirmed? Yes   Relationship to patient of person spoken to? Self   Refill Screening Questions    Changes to allergies? No   Changes to medications? No   New conditions since last clinic visit? No   Unplanned office visit, urgent care, ED, or hospital admission in the last 4 weeks? No   How does patient/caregiver feel medication is working? Good   Financial problems or insurance changes? No   How many doses of your specialty medications were missed in the last 4 weeks? 0   Would patient like to speak to a pharmacist? No   When does the patient need to receive the medication? 11/11/22   Refill Delivery Questions    How will the patient receive the medication? MEDRx   When does the patient need to receive the medication? 11/11/22   Shipping Address Home   Address in [facility] confirmed and updated if neccessary? Yes   Expected Copay ($) 0   Is the patient able to afford the medication copay? Yes   Payment Method zero copay   Days supply of Refill 28   Supplies needed? No supplies needed   Refill activity completed? Yes   Refill activity plan Refill scheduled   Shipment/Pickup Date: 11/09/22            Current Outpatient Medications   Medication Sig    albuterol (PROVENTIL/VENTOLIN HFA) 90 mcg/actuation inhaler Inhale 4 puffs into the lungs every 4 (four) hours as needed for Wheezing or Shortness of Breath (and/or persistent coughing). Rescue    azelastine (ASTELIN) 137 mcg (0.1 %) nasal spray 1 spray (137 mcg total) by Nasal route 2 (two) times daily. (Patient not taking: No sig  reported)    cetirizine (ZYRTEC) 10 MG tablet Take 1 tablet (10 mg total) by mouth once daily.    chlorhexidine (PERIDEX) 0.12 % solution SMARTSIG:By Mouth    diphenhydrAMINE (BENADRYL) 25 mg capsule Take 25 mg by mouth once daily. Takes one 25mg capsule every morning    dupilumab (DUPIXENT PEN) 300 mg/2 mL PnIj Inject 300 mg into the skin every 14 (fourteen) days.    EPINEPHrine (EPIPEN 2-BRYAN) 0.3 mg/0.3 mL AtIn Inject 0.3 mLs (0.3 mg total) into the muscle once. for 1 dose    FLOVENT  mcg/actuation inhaler     hydrocortisone 2.5 % ointment Apply topically.    montelukast (SINGULAIR) 10 mg tablet Take 1 tablet (10 mg total) by mouth every evening.    olopatadine (PATANOL) 0.1 % ophthalmic solution Place 1 drop into both eyes 2 (two) times daily.   Last reviewed on 10/25/2022  9:39 AM by Bryce Pride RN    Review of patient's allergies indicates:   Allergen Reactions    Iodine and iodide containing products     Shellfish containing products     Last reviewed on  10/25/2022 9:37 AM by Bryce Pride      Tasks added this encounter   12/2/2022 - Refill Call (Auto Added)   Tasks due within next 3 months   No tasks due.     Delores Galarza  [facility] Pharm[facility]  [address] [facility] [address]  Phone: [phone]  Fax: [phone]

## 2022-11-09 ENCOUNTER — PATIENT MESSAGE (OUTPATIENT)
Dept: PEDIATRIC PULMONOLOGY | Facility: CLINIC | Age: 22
End: 2022-11-09
Payer: MEDICAID

## 2022-12-02 ENCOUNTER — SPECIALTY PHARMACY (OUTPATIENT)
Dept: PHARMACY | Facility: CLINIC | Age: 22
End: 2022-12-02
Payer: MEDICAID

## 2022-12-02 NOTE — TELEPHONE ENCOUNTER
Specialty Pharmacy - Refill Coordination    Specialty Medication Orders Linked to Encounter      Flowsheet Row Most Recent Value   Medication #1 dupilumab (DUPIXENT PEN) 300 mg/2 mL PnIj (Order#[id number], Rx#[id number])            Refill Questions - Documented Responses      Flowsheet Row Most Recent Value   Patient Availability and HIPAA Verification    Does patient want to proceed with activity? Yes   HIPAA/medical authority confirmed? Yes   Relationship to patient of person spoken to? Self   Refill Screening Questions    Changes to allergies? No   Changes to medications? No   New conditions since last clinic visit? No   Unplanned office visit, urgent care, ED, or hospital admission in the last 4 weeks? No   How does patient/caregiver feel medication is working? Good   Financial problems or insurance changes? No   How many doses of your specialty medications were missed in the last 4 weeks? 0   Would patient like to speak to a pharmacist? No   When does the patient need to receive the medication? 12/09/22   Refill Delivery Questions    How will the patient receive the medication? MEDRx   When does the patient need to receive the medication? 12/09/22   Shipping Address Home   Address in [facility] confirmed and updated if neccessary? Yes   Expected Copay ($) 0   Is the patient able to afford the medication copay? Yes   Payment Method zero copay   Days supply of Refill 28   Supplies needed? No supplies needed   Refill activity completed? Yes   Refill activity plan Refill scheduled   Shipment/Pickup Date: 12/07/22            Current Outpatient Medications   Medication Sig    albuterol (PROVENTIL/VENTOLIN HFA) 90 mcg/actuation inhaler Inhale 4 puffs into the lungs every 4 (four) hours as needed for Wheezing or Shortness of Breath (and/or persistent coughing). Rescue    azelastine (ASTELIN) 137 mcg (0.1 %) nasal spray 1 spray (137 mcg total) by Nasal route 2 (two) times daily. (Patient not taking: No sig  reported)    cetirizine (ZYRTEC) 10 MG tablet Take 1 tablet (10 mg total) by mouth once daily.    chlorhexidine (PERIDEX) 0.12 % solution SMARTSIG:By Mouth    diphenhydrAMINE (BENADRYL) 25 mg capsule Take 25 mg by mouth once daily. Takes one 25mg capsule every morning    dupilumab (DUPIXENT PEN) 300 mg/2 mL PnIj Inject 300 mg into the skin every 14 (fourteen) days.    EPINEPHrine (EPIPEN 2-BRYAN) 0.3 mg/0.3 mL AtIn Inject 0.3 mLs (0.3 mg total) into the muscle once. for 1 dose    FLOVENT  mcg/actuation inhaler     hydrocortisone 2.5 % ointment Apply topically.    montelukast (SINGULAIR) 10 mg tablet Take 1 tablet (10 mg total) by mouth every evening.    olopatadine (PATANOL) 0.1 % ophthalmic solution Place 1 drop into both eyes 2 (two) times daily.   Last reviewed on 10/25/2022  9:39 AM by Bryce Pride RN    Review of patient's allergies indicates:   Allergen Reactions    Iodine and iodide containing products     Shellfish containing products     Last reviewed on  10/25/2022 9:37 AM by Bryce Pride      Tasks added this encounter   12/30/2022 - Refill Call (Auto Added)   Tasks due within next 3 months   No tasks due.     Delores Galarza  [facility] - Specialty Pharmacy  [address] [address]  Phone: [phone]  Fax: [phone]

## 2022-12-27 NOTE — PT/OT/SLP PROGRESS
"Physical Therapy  Treatment/ DC    Pop Lawler   MRN: [id number]   Admitting Diagnosis: Status asthmaticus    PT Received On: 17  PT Start Time: 1100     PT Stop Time: 1115    PT Total Time (min): 15 min       Billable Minutes:  Gait Training 15    Treatment Type: Treatment  PT/PTA: PT           General Precautions: Standard, respiratory, fall  Orthopedic Precautions: N/A     Do you have any cultural, spiritual, Latter-day conflicts, given your current situation?: Patient has no barriers to learning. Patient verbalizes understanding of his/her program and goals and demonstrates them correctly. No cultural, spiritual or educational needs identified.    Subjective:  Communicated with LORRIE Mo  prior to session. States pt is ok for PT. States patient is on 1 L of oxygen now (10 L of oxygen yesterday during ambulation)    Upon entering room, pt was sitting up in bed with aunt present. Pt agrees to PT and states he is "ready to walk." Aunt said he had a lot of energy from being in bed all day. Pt reports no pain or discomfort. Pt states walking felt great yesterday.    Pain Ratin/10  Pain Rating Post-Intervention: 0/10    Objective:   Patient found with: pulse ox (continuous), telemetry, oxygen    Resting vitals  bpm Pulse ox 92%  Vitals during ambulation pulse ox 92-94% throughout walk.  Vitals post session  bpm Pulse Ox: 94%    Functional Mobility:  Bed Mobility:   Scooting/Bridging: Supervision (x 1 trial getting out of bed)  Supine to Sit: Supervision (x 1 trial getting out of bed)  Sit to Supine: Supervision (x 1 trial getting into bed)    Transfers:  Sit <> Stand Assistance: Supervision (x 1 trial getting out of bed)  Sit <> Stand Assistive Device: No Assistive Device    Gait:   Gait Distance: pt ambulated ~ 820 feet SBA  on 1 L Oxygen from EOB around unit 4 times and back to bed. with no episodes of LOB. Pt sats were 92-94% throughout entire walk. Pt reported no pain or discomfort with " walk. Stated he felt good and could walk much farther if he wanted.  Assistance 1: Stand by Assistance  Gait Assistive Device: No device  Gait Pattern: 2-point gait  Gait Deviation(s):  (pt feet a little wobbly. previously stated thats how he walks normally.)    Balance:   Static Sit: GOOD-: Takes MODERATE challenges from all directions but inconsistently  Dynamic Sit: GOOD+: Maintains balance through MAXIMAL excursions of active trunk motion  Static Stand: GOOD: Takes MODERATE challenges from all directions  Dynamic stand: GOOD: Needs SUPERVISION only during gait and able to self right with moderate      Therapeutic Activities and Exercises:  1. Pt able to ambulate ~ 820 feet on 1 L oxygen with sats staying 92-94% throughout. Pt walks at an appropriate gait speed and has no episodes of LOB. Pt states he is not fatigued at all after walking 4 laps. Spoke with pt and family about walking on his own throughout the day with someone there for supervision just in case. D/C pt from acute PT services.     AM-PAC 6 CLICK MOBILITY  How much help from another person does this patient currently need?   1 = Unable, Total/Dependent Assistance  2 = A lot, Maximum/Moderate Assistance  3 = A little, Minimum/Contact Guard/Supervision  4 = None, Modified [place]/Independent    Turning over in bed (including adjusting bedclothes, sheets and blankets)?: 4  Sitting down on and standing up from a chair with arms (e.g., wheelchair, bedside commode, etc.): 4  Moving from lying on back to sitting on the side of the bed?: 4  Moving to and from a bed to a chair (including a wheelchair)?: 4  Need to walk in hospital room?: 4  Climbing 3-5 steps with a railing?: 3  Total Score: 23    AM-PAC Raw Score CMS G-Code Modifier Level of Impairment Assistance   6 % Total / Unable   7 - 9 CM 80 - 100% Maximal Assist   10 - 14 CL 60 - 80% Moderate Assist   15 - 19 CK 40 - 60% Moderate Assist   20 - 22 CJ 20 - 40% Minimal Assist   23 CI 1-20%  SBA / CGA   24 CH 0% Independent/ Mod I     Patient left sitting up in bed with all lines intact, call button in reach and aunt present.    Assessment:  Pop Lawler is a 16 y.o. male with a medical diagnosis of Status asthmaticus and presents with weakness, and decreased endurance. Pt tolerated treatment well today as he was able to ambulate ~ 820 feet on 1 L oxygen with sats remaining 92-94% throughout walk. Pt reports no pain or difficulty with walking. Pt educated that he can walk multiple times throughout the day as long as someone is with him just in case. Pt to D/C from acute PT services.    Rehab identified problem list/impairments: weakness, impaired endurance, impaired functional mobilty, gait instability, decreased upper extremity function, decreased lower extremity function, impaired cardiopulmonary response to activity    Rehab potential is good.    Activity tolerance: Good    Discharge recommendations:  home     Barriers to discharge:  None    Equipment recommendations:  none     GOALS:   Physical Therapy Goals        Problem: Physical Therapy Goal    Goal Priority Disciplines Outcome Goal Variances Interventions   Physical Therapy Goal     PT/OT, PT      Description:  Goals to be met by: 16    Patient will increase functional independence with mobility by performin. Pt to ambulate ~500 SBA with no requested rest breaks without stats dropping below 90%. MET ()  2. Pt to perform 10 sit to stand exercises in 60 seconds with no rest breaks.  3. Pt to ascend/descend 5 steps SBA no AD.    Added: 17  4. Pt to ambulated 1000 feet SBA with no additional oxygen while sats remain >90%.                   PLAN:    Pt to D/C from acute PT services. Pt and family agree and are informed.  Plan of Care reviewed with: patient, family     Urvashi Marte, SPT  2017     IV discontinued, cath removed intact

## 2022-12-30 ENCOUNTER — PATIENT MESSAGE (OUTPATIENT)
Dept: PEDIATRIC PULMONOLOGY | Facility: CLINIC | Age: 22
End: 2022-12-30
Payer: MEDICAID

## 2022-12-30 ENCOUNTER — SPECIALTY PHARMACY (OUTPATIENT)
Dept: PHARMACY | Facility: CLINIC | Age: 22
End: 2022-12-30
Payer: MEDICAID

## 2022-12-30 DIAGNOSIS — J45.998 WELL CONTROLLED PERSISTENT ASTHMA: ICD-10-CM

## 2022-12-30 DIAGNOSIS — L20.9 ATOPIC DERMATITIS, UNSPECIFIED TYPE: ICD-10-CM

## 2022-12-30 NOTE — TELEPHONE ENCOUNTER
Outgoing call: Patient is due to inject on 1/6, I informed him that a refill request was sent to his doctor and once approved [facility] will follow up.

## 2023-01-03 RX ORDER — DUPILUMAB 300 MG/2ML
300 INJECTION, SOLUTION SUBCUTANEOUS
Qty: 4 ML | Refills: 11 | Status: SHIPPED | OUTPATIENT
Start: 2023-01-03 | End: 2023-12-05 | Stop reason: SDUPTHER

## 2023-01-03 NOTE — TELEPHONE ENCOUNTER
Pop Lawler Key: [id number] - PA Case ID: [id number]    PA submitted via [facility] on 1/3

## 2023-01-04 ENCOUNTER — TELEPHONE (OUTPATIENT)
Dept: PEDIATRIC PULMONOLOGY | Facility: CLINIC | Age: 23
End: 2023-01-04
Payer: MEDICAID

## 2023-01-04 ENCOUNTER — PATIENT MESSAGE (OUTPATIENT)
Dept: PEDIATRIC PULMONOLOGY | Facility: CLINIC | Age: 23
End: 2023-01-04
Payer: MEDICAID

## 2023-01-04 DIAGNOSIS — G47.33 OSA (OBSTRUCTIVE SLEEP APNEA): Primary | ICD-10-CM

## 2023-01-04 NOTE — TELEPHONE ENCOUNTER
Specialty Pharmacy - Refill Coordination  Specialty Pharmacy - Medication/Referral Authorization    Specialty Medication Orders Linked to Encounter      Flowsheet Row Most Recent Value   Medication #1 dupilumab (DUPIXENT PEN) 300 mg/2 mL PnIj (Order#[id number], Rx#)            Refill Questions - Documented Responses      Flowsheet Row Most Recent Value   Patient Availability and HIPAA Verification    Does patient want to proceed with activity? Yes   HIPAA/medical authority confirmed? Yes   Relationship to patient of person spoken to? Self   Refill Screening Questions    Changes to allergies? No   Changes to medications? No   New conditions since last clinic visit? No   Unplanned office visit, urgent care, ED, or hospital admission in the last 4 weeks? No   How does patient/caregiver feel medication is working? Good   Financial problems or insurance changes? No   How many doses of your specialty medications were missed in the last 4 weeks? 0   Would patient like to speak to a pharmacist? No   When does the patient need to receive the medication? 01/06/23   Refill Delivery Questions    How will the patient receive the medication? MEDRx   When does the patient need to receive the medication? 01/06/23   Shipping Address Home   Address in [facility] confirmed and updated if neccessary? Yes   Expected Copay ($) 0   Is the patient able to afford the medication copay? Yes   Payment Method zero copay   Days supply of Refill 28   Supplies needed? No supplies needed   Refill activity completed? Yes   Refill activity plan Refill scheduled   Shipment/Pickup Date: 01/05/23            Current Outpatient Medications   Medication Sig    albuterol (PROVENTIL/VENTOLIN HFA) 90 mcg/actuation inhaler Inhale 4 puffs into the lungs every 4 (four) hours as needed for Wheezing or Shortness of Breath (and/or persistent coughing). Rescue    azelastine (ASTELIN) 137 mcg (0.1 %) nasal spray 1 spray (137 mcg total) by Nasal route 2 (two)  times daily. (Patient not taking: No sig reported)    cetirizine (ZYRTEC) 10 MG tablet Take 1 tablet (10 mg total) by mouth once daily.    chlorhexidine (PERIDEX) 0.12 % solution SMARTSIG:By Mouth    diphenhydrAMINE (BENADRYL) 25 mg capsule Take 25 mg by mouth once daily. Takes one 25mg capsule every morning    dupilumab (DUPIXENT PEN) 300 mg/2 mL PnIj Inject 300 mg into the skin every 14 (fourteen) days.    EPINEPHrine (EPIPEN 2-BRYAN) 0.3 mg/0.3 mL AtIn Inject 0.3 mLs (0.3 mg total) into the muscle once. for 1 dose    FLOVENT  mcg/actuation inhaler     hydrocortisone 2.5 % ointment Apply topically.    montelukast (SINGULAIR) 10 mg tablet Take 1 tablet (10 mg total) by mouth every evening.    olopatadine (PATANOL) 0.1 % ophthalmic solution Place 1 drop into both eyes 2 (two) times daily.   Last reviewed on 10/25/2022  9:39 AM by Bryce Pride RN    Review of patient's allergies indicates:   Allergen Reactions    Iodine and iodide containing products     Shellfish containing products     Last reviewed on  10/25/2022 9:37 AM by Bryce Pride      Tasks added this encounter   1/27/2023 - Refill Call (Auto Added)   Tasks due within next 3 months   No tasks due.     Charles Perez [facility] - Specialty Pharmacy  [address] [place] [address]  Phone: [phone]  Fax: [phone]

## 2023-01-04 NOTE — TELEPHONE ENCOUNTER
Peer to peer completed with Juju at [facility] medicaid peer to peer department     PA case #:[id number]    Dates: 1/4/2023 to 1/4/2024

## 2023-01-04 NOTE — TELEPHONE ENCOUNTER
HSAT with AHI 4 on 4% criteria. Plan for in lab study. Patient verbalized understanding.    Leyden M. Lozada-Jimenez

## 2023-01-10 ENCOUNTER — TELEPHONE (OUTPATIENT)
Dept: SLEEP MEDICINE | Facility: OTHER | Age: 23
End: 2023-01-10
Payer: MEDICAID

## 2023-01-11 ENCOUNTER — HOSPITAL ENCOUNTER (OUTPATIENT)
Dept: SLEEP MEDICINE | Facility: OTHER | Age: 23
Discharge: HOME | End: 2023-01-11
Attending: GENERAL ACUTE CARE HOSPITAL
Payer: MEDICAID

## 2023-01-11 DIAGNOSIS — G47.33 OSA (OBSTRUCTIVE SLEEP APNEA): ICD-10-CM

## 2023-01-11 PROCEDURE — 95810 POLYSOM 6/> YRS 4/> PARAM: CPT

## 2023-01-12 PROBLEM — G47.33 OSA (OBSTRUCTIVE SLEEP APNEA): Status: ACTIVE | Noted: 2023-01-12

## 2023-01-12 NOTE — PROGRESS NOTES
Pop Lawler to [facility] on 1/11/23 for an overnight baseline study.     Pt did not meet criteria for split night study.         Post study information given to pt in AM

## 2023-01-19 ENCOUNTER — PATIENT MESSAGE (OUTPATIENT)
Dept: PEDIATRIC PULMONOLOGY | Facility: CLINIC | Age: 23
End: 2023-01-19

## 2023-01-19 PROCEDURE — 95810 POLYSOM 6/> YRS 4/> PARAM: CPT | Mod: 26,,, | Performed by: GENERAL ACUTE CARE HOSPITAL

## 2023-01-19 PROCEDURE — 95810 PR POLYSOMNOGRAPHY, 4 OR MORE: ICD-10-PCS | Mod: 26,,, | Performed by: GENERAL ACUTE CARE HOSPITAL

## 2023-01-19 NOTE — PROCEDURES
"[facility]    Polysomnography Interpretation Report    Patient Name:  DELMER TRAORE  MRN#:  [id number]  :  2000  Study Date:  2023  Referring Provider:  MD Donald    Indications for Polysomnography:  The patient is a 22 year old Male who is 5' 11" and weighs 257.0 lbs.  His BMI equals 36.0.  A full night polysomnogram was performed to evaluate for Sleep disordered breathing. The patient presented with history of breathing pauses and mouth breathing. Home sleep study on 22 was normal. Prior interventions include: None .Past medical history: Severe eczema, asthma Medications: Albuterol, Astelin, Cetirizine, Peridex, Benadryl, Epipen, Dupixent, Flovent, Hydrocortisone, Montelukast, Olopatadine.    Polysomnogram Data  A full night polysomnogram recorded the standard physiologic parameters including EEG, EOG, EMG, EKG, nasal and oral airflow.  Respiratory parameters of chest and abdominal movements were recorded with [place]-Crystal motion transducers.  Oxygen saturation was recorded by pulse oximetry.    Sleep Architecture  The total recording time of the polysomnogram was 517.9 minutes.  The total sleep time was 330.5 minutes.  The patient spent 15.9% of total sleep time in Stage N1, 67.9% in Stage N2, 15.9% in Stages N3, and 0.3% in REM.  Sleep latency was 15.4 minutes.  REM latency was 219.0 minutes.  Sleep Efficiency was 63.8%.  Wake after sleep onset was 171.5 minutes.    Respiratory Events  The polysomnogram revealed a presence of 0 obstructive, 0 central, and 0 mixed apneas resulting in a Total Apnea index of 0 events per hour.  There were 4 hypopneas resulting in a Total Hypopnea index of 0.7 events per hour.  The combined Apnea/Hypopnea index was 0.7 events per hour.  There were a total of 0 RERA events resulting in a Respiratory Disturbance Index (RDI) of 0.7 events per hour. Mean oxygen saturation was 94.4%.  The lowest oxygen saturation during sleep was 86.0%.  Time " "spent ?88% oxygen saturation was 1.5 minutes (0.3%).    Limb Activity  There were 12 limb movements recorded.  Of this total, 10 were classified as PLMs.  Of the PLMs, 1 were associated with arousals.  The Limb Movement index was 2.2 per hour while the PLM index was 1.8 per hour and PLM with arousals index was 0.2 per hour.    Cardiac Summary  The average pulse rate was 66.2 bpm.  The minimum pulse rate was 52.0 bpm while the maximum pulse rate was 108.0 bpm.    Diagnosis:     This study does not demonstrate obstructive sleep apnea. Primary snoring/mild sleep related breathing disruption (R06. 83) was noted. The obstructive apnea-hypopnea index (AHI) was 0.7  (within normal limits).  No significant hypoxemia or hypercapnia was noted.   Poor sleep architecture for age with arousals/awakenings likely related to technical interventions and study related disruptions (first night effect).      Recommendations:    Follow up with referring provider.      Thank you for referring this patient to the [facility].    I have reviewed the attached data report and the raw data tracings of this study epoch-by-epoch and have determined that the recording quality and scoring of events are sufficient to allow for interpretation and electronically signed by:    Leyden Lozada, MD  Pediatric Pulmonology and Sleep Medicine  [facility]  Office: [phone]  Fax: [phone]    [facility]    Diagnostic PSG Report    Patient Name: DELMER TRAORE Study Date: 1/11/2023   YOB: 2000 MRN #: [id number]   Age: 22 year ALICE #: [id number]   Sex: Male Referring Provider: MD Donald   Height: 5' 11" Recording Tech: Tim Smith RPSGT   Weight: 257.0 lbs Scoring Tech: Alfonso Dickson RRT RPSGT   BMI: 36.0 Interpreting Physician: Leyden Lozada-Jimenez, MD   ESS: - Neck Circumference: -     Study Overview    Lights Off: 08:20:22 PM  Count Index   Lights On: 04:58:14 AM " Awakenings: 93 16.9   Time in Bed: 517.9 min. Arousals: 60 10.9   Total Sleep Time: 330.5 min. Apneas & Hypopneas: 4 0.7    Sleep Efficiency: 63.8% Limb Movements: 12 2.2   Sleep Latency: 15.4 min. Snores: - -   Wake After Sleep Onset: 171.5 min. Desaturations: 9 1.6    REM Latency from Sleep Onset: 219.0 min. Minimum SpO2 TST: 86.0%        Sleep Architecture   % of Time in Bed  Stages Time (mins) % Sleep Time   Wake 187.5    Stage N1 52.5 15.9%   Stage N2 224.5 67.9%   Stage N3 52.5 15.9%   REM 1.0 0.3%         Arousal Summary     NREM REM Sleep Index   Respiratory Arousals - - - -   PLM Arousals 1 - 1 0.2   Isolated Limb Movement Arousals - - - -   Spontaneous Arousals 58 1 59 10.7   Total 59 1 60 10.9       Limb Movement Summary     Count Index   Isolated Limb Movements 2 0.4   Periodic Limb Movements (PLMs) 10 1.8   Total Limb Movements 12 2.2         Respiratory Summary     By Sleep Stage By Body Position Total    NREM REM Supine Non-Supine    Time (min) 329.5 1.0 155.0 175.5 330.5           Obstructive Apnea - - - - -   Mixed Apnea - - - - -   Central Apnea - - - - -   Total Apneas - - - - -   Total Apnea Index - - - - -           Hypopnea 4 - 1 3 4   Hypopnea Index 0.7 - 0.4 1.0 0.7           Apnea & Hypopnea 4 - 1 3 4   Apnea & Hypopnea Index 0.7 - 0.4 1.0 0.7           RERAs - - - - -   RERA Index - - - - -           RDI 0.7 - 0.4  to1.0 0.7     Scoring Criteria: Hypopneas scored at 3.% desaturation criteria.    Respiratory Event Durations     Apnea Hypopnea    NREM REM NREM REM   Average (seconds) - - 11.4 -   Maximum (seconds) - - 12.5 -       Oxygen Saturation Summary     Wake NREM REM TST TIB   Average SpO2 95.0% 94.1% 93.7% 94.1% 94.4%   Minimum SpO2 85.0% 86.0% 92.0% 86.0% 85.0%   Maximum SpO2 100.0% 98.0% 95.0% 98.0% 100.0%       Oxygen Saturation Distribution    Range (%) Time in range (min) Time in range (%)   90.0 - 100.0 479.0 99.4%   80.0 - 90.0 2.7 0.6%   70.0 - 80.0 - -   60.0 - 70.0 - -    50.0 - 60.0 - -   0.0 - 50.0 - -   Time Spent ?88% SpO2    Range (%) Time in range (min) Time in range (%)   0.0 - 88.0 1.5 0.3%          Count Index   Desaturations 9 1.6      Cardiac Summary     Wake NREM REM Sleep Total   Average Pulse Rate (BPM) 71.2 63.4 67.8 63.4 66.2   Minimum Pulse Rate (BPM) 52.0 53.0 61.0 53.0 52.0   Maximum Pulse Rate (BPM) 108.0 98.0 73.0 98.0 108.0     Pulse Rate Distribution    Range (bpm) Time in range (min) Time in range (%)   0.0 - 40.0 - -   40.0 - 60.0 133.8 27.8%   60.0 - 80.0 314.6 65.3%   80.0 - 100.0 32.5 6.7%   100.0 - 120.0 0.6 0.1%   120.0 - 140.0 - -   140.0 - 200.0 - -     EtCO2 Summary    Stage Min (mmHg) Average (mmHg) Max (mmHg)   Wake - - -   NREM(1+2+3) - - -   REM - - -     Range (mmHg) Time in range (min) Time in range (%)   - - -   - - -   - - -   - - -   - - -     TcCO2 Summary    Stage Min (mmHg) Average (mmHg) Max (mmHg)   Wake - - -   NREM(1+2+3) - - -   REM - - -     Range (mmHg) Time in range (min) Time in range (%)   20.0 - 40.0 - -   40.0 - 50.0 - -   50.0 - 55.0 - -   55.0 - 100.0 - -   Excluded data <20.0 & >65.0 518.0 100.0%     Comments    -

## 2023-01-24 ENCOUNTER — TELEPHONE (OUTPATIENT)
Dept: ALLERGY | Facility: CLINIC | Age: 23
End: 2023-01-24
Payer: MEDICAID

## 2023-01-24 NOTE — TELEPHONE ENCOUNTER
Spoke with patient to assist with scheduling an appointment unable to assist I advised patient that I would get the assistance from my supervisor to assist she's in a meeting  and that I would give him a call back tomorrow.

## 2023-01-30 ENCOUNTER — PATIENT MESSAGE (OUTPATIENT)
Dept: PHARMACY | Facility: CLINIC | Age: 23
End: 2023-01-30
Payer: MEDICAID

## 2023-02-08 ENCOUNTER — OFFICE VISIT (OUTPATIENT)
Dept: PEDIATRIC PULMONOLOGY | Facility: CLINIC | Age: 23
End: 2023-02-08
Payer: MEDICAID

## 2023-02-08 VITALS
HEIGHT: 71 IN | WEIGHT: 258.69 LBS | HEART RATE: 86 BPM | OXYGEN SATURATION: 98 % | BODY MASS INDEX: 36.22 KG/M2 | RESPIRATION RATE: 18 BRPM

## 2023-02-08 DIAGNOSIS — J30.9 ALLERGIC RHINITIS, UNSPECIFIED SEASONALITY, UNSPECIFIED TRIGGER: ICD-10-CM

## 2023-02-08 DIAGNOSIS — J45.50 SEVERE PERSISTENT ASTHMA, UNSPECIFIED WHETHER COMPLICATED: ICD-10-CM

## 2023-02-08 DIAGNOSIS — R05.3 CHRONIC COUGH: ICD-10-CM

## 2023-02-08 DIAGNOSIS — J45.901 NOT WELL CONTROLLED ASTHMA WITH ACUTE EXACERBATION, UNSPECIFIED ASTHMA SEVERITY, UNSPECIFIED WHETHER PERSISTENT: ICD-10-CM

## 2023-02-08 DIAGNOSIS — T78.40XS ALLERGY, SEQUELA: ICD-10-CM

## 2023-02-08 DIAGNOSIS — H10.10 ALLERGIC CONJUNCTIVITIS, UNSPECIFIED LATERALITY: ICD-10-CM

## 2023-02-08 LAB
FEF 25 75 LLN: 3.37
FEF 25 75 PRE REF: 97.5 %
FEF 25 75 REF: 5.17
FEV05 LLN: 1.79
FEV05 REF: 3.22
FEV1 FVC LLN: 74
FEV1 FVC PRE REF: 98.3 %
FEV1 FVC REF: 85
FEV1 LLN: 3.9
FEV1 PRE REF: 96.6 %
FEV1 REF: 4.81
FVC LLN: 4.61
FVC PRE REF: 97.2 %
FVC REF: 5.69
PEF LLN: 8.03
PEF PRE REF: 85.6 %
PEF REF: 10.76
PHYSICIAN COMMENT: NORMAL
PRE FEF 25 75: 5.04 L/S (ref 3.37–7.36)
PRE FET 100: 4.95 SEC
PRE FEV05 REF: 107 %
PRE FEV1 FVC: 84.02 % (ref 73.63–95.16)
PRE FEV1: 4.64 L (ref 3.9–5.69)
PRE FEV5: 3.44 L (ref 1.79–4.65)
PRE FVC: 5.53 L (ref 4.61–6.77)
PRE PEF: 9.21 L/S (ref 8.03–13.5)

## 2023-02-08 PROCEDURE — 99214 OFFICE O/P EST MOD 30 MIN: CPT | Mod: 25,S$PBB,, | Performed by: GENERAL ACUTE CARE HOSPITAL

## 2023-02-08 PROCEDURE — 99214 PR OFFICE/OUTPT VISIT, EST, LEVL IV, 30-39 MIN: ICD-10-PCS | Mod: 25,S$PBB,, | Performed by: GENERAL ACUTE CARE HOSPITAL

## 2023-02-08 PROCEDURE — 99999 PR PBB SHADOW E&M-EST. PATIENT-LVL III: ICD-10-PCS | Mod: PBBFAC,,, | Performed by: GENERAL ACUTE CARE HOSPITAL

## 2023-02-08 PROCEDURE — 3008F BODY MASS INDEX DOCD: CPT | Mod: CPTII,,, | Performed by: GENERAL ACUTE CARE HOSPITAL

## 2023-02-08 PROCEDURE — 94010 BREATHING CAPACITY TEST: CPT | Mod: 26,S$PBB,, | Performed by: GENERAL ACUTE CARE HOSPITAL

## 2023-02-08 PROCEDURE — 99999 PR PBB SHADOW E&M-EST. PATIENT-LVL III: CPT | Mod: PBBFAC,,, | Performed by: GENERAL ACUTE CARE HOSPITAL

## 2023-02-08 PROCEDURE — 99213 OFFICE O/P EST LOW 20 MIN: CPT | Mod: PBBFAC,25 | Performed by: GENERAL ACUTE CARE HOSPITAL

## 2023-02-08 PROCEDURE — 1159F PR MEDICATION LIST DOCUMENTED IN MEDICAL RECORD: ICD-10-PCS | Mod: CPTII,,, | Performed by: GENERAL ACUTE CARE HOSPITAL

## 2023-02-08 PROCEDURE — 94010 BREATHING CAPACITY TEST: ICD-10-PCS | Mod: 26,S$PBB,, | Performed by: GENERAL ACUTE CARE HOSPITAL

## 2023-02-08 PROCEDURE — 3008F PR BODY MASS INDEX (BMI) DOCUMENTED: ICD-10-PCS | Mod: CPTII,,, | Performed by: GENERAL ACUTE CARE HOSPITAL

## 2023-02-08 PROCEDURE — 94010 BREATHING CAPACITY TEST: CPT | Mod: PBBFAC | Performed by: GENERAL ACUTE CARE HOSPITAL

## 2023-02-08 PROCEDURE — 95012 NITRIC OXIDE EXP GAS DETER: CPT | Mod: PBBFAC | Performed by: GENERAL ACUTE CARE HOSPITAL

## 2023-02-08 PROCEDURE — 1159F MED LIST DOCD IN RCRD: CPT | Mod: CPTII,,, | Performed by: GENERAL ACUTE CARE HOSPITAL

## 2023-02-08 RX ORDER — MONTELUKAST SODIUM 10 MG/1
10 TABLET ORAL NIGHTLY
Qty: 30 TABLET | Refills: 11 | Status: SHIPPED | OUTPATIENT
Start: 2023-02-08

## 2023-02-08 RX ORDER — ALBUTEROL SULFATE 90 UG/1
4 AEROSOL, METERED RESPIRATORY (INHALATION) EVERY 4 HOURS PRN
Qty: 8.5 G | Refills: 3 | Status: SHIPPED | OUTPATIENT
Start: 2023-02-08 | End: 2023-08-01 | Stop reason: SDUPTHER

## 2023-02-08 RX ORDER — OLOPATADINE HYDROCHLORIDE 1 MG/ML
1 SOLUTION/ DROPS OPHTHALMIC 2 TIMES DAILY
Qty: 5 ML | Refills: 3 | Status: SHIPPED | OUTPATIENT
Start: 2023-02-08 | End: 2024-02-08

## 2023-02-08 RX ORDER — CETIRIZINE HYDROCHLORIDE 10 MG/1
10 TABLET ORAL DAILY
Qty: 30 TABLET | Refills: 2 | Status: SHIPPED | OUTPATIENT
Start: 2023-02-08 | End: 2024-02-08

## 2023-02-08 NOTE — PROGRESS NOTES
Pediatric Pulmonology clinic  Follow up    Pop is a 22 y.o. male here for asthma follow up.    HPI/RESPIRATORY SYMPTOMS:     The patient's last visit with me was on 10/25/2022.  Pop is a 21 year old male with history of severe eczema, asthma, here for follow up. Symptoms are well controlled.     PLAN:  Continue Dupixent as prescribed  I recommended the use of the following rescue medications for asthma exacerbation:  Albuterol 4 puffs/1 vial Q4 hrs PRN cough, wheezing or dyspnea or to begin at the first start of a URI. Patient has enough refills.  Asthma action plan, spacer provided today with demonstration and educational pamphlets provided  Avoidance of precipitants  HSAT to monitor for sleep disordered breathing symptoms.      Interval changes  No ED/hospitalizations. Denies any baseline symptoms. HSAT and PSG were normal. Patient inquired that his eyes are red and he recently developed eczema on his upper lip which did not use to be there before. He is inquiring whether this is a side effect of the Dupixent.    Asthma Symptoms/Control:  Controllers: Dupixent every 2 weeks  How often missing/week: never  Rescue: Albuterol (Last >6 months)  Spacer use: yes  OCS course since last visit: none  Triggers: URI, weather change  His current symptoms in the last 3 months include:    Cough - 0 per week  Wheezing - 0 per week  SOB - 0 per week  He does not have nocturnal coughing when not acutely ill with respiratory illness.   Prolonged coughing with a URI (2-3 weeks duration): none.  EIB: -.     Comorbidities:  AR: denies  AD: Not well controlled on Dupixent. Does not have a consistent skin regimen. Uses cerave lotion prn. Has dermatology follow up.  FA: Shellfish, has unexpired epipen.  SRBD: denies snoring, breathing pauses. + mouth breathing.HSAT and PSG were normal.  GERD: denies    SH:   Lives with girlfriend and parents    Environmental history:                    Pets in the home: none                     Hiren: tile                    Air conditioning: Good condition                    Heating: Good condition                    Mold / water damage: denies                    Tobacco smoke: denies                    Goes to school to become a  and works        Travel history: denies            Current Medications:     Current Outpatient Medications:     diphenhydrAMINE (BENADRYL) 25 mg capsule, Take 25 mg by mouth once daily. Takes one 25mg capsule every morning, Disp: , Rfl:     hydrocortisone 2.5 % ointment, Apply topically., Disp: , Rfl:     albuterol (PROVENTIL/VENTOLIN HFA) 90 mcg/actuation inhaler, Inhale 4 puffs into the lungs every 4 (four) hours as needed for Wheezing or Shortness of Breath (and/or persistent coughing). Rescue, Disp: 8.5 g, Rfl: 3    azelastine (ASTELIN) 137 mcg (0.1 %) nasal spray, 1 spray (137 mcg total) by Nasal route 2 (two) times daily. (Patient not taking: Reported on 6/23/2021), Disp: 30 mL, Rfl: 0    cetirizine (ZYRTEC) 10 MG tablet, Take 1 tablet (10 mg total) by mouth once daily., Disp: 30 tablet, Rfl: 2    chlorhexidine (PERIDEX) 0.12 % solution, SMARTSIG:By Mouth, Disp: , Rfl:     dupilumab (DUPIXENT PEN) 300 mg/2 mL PnIj, Inject 300 mg into the skin every 14 (fourteen) days. (Patient not taking: Reported on 2/8/2023), Disp: 4 mL, Rfl: 11    EPINEPHrine (EPIPEN 2-BRYAN) 0.3 mg/0.3 mL AtIn, Inject 0.3 mLs (0.3 mg total) into the muscle once. for 1 dose, Disp: 2 each, Rfl: 1    montelukast (SINGULAIR) 10 mg tablet, Take 1 tablet (10 mg total) by mouth every evening., Disp: 30 tablet, Rfl: 11    olopatadine (PATANOL) 0.1 % ophthalmic solution, Place 1 drop into both eyes 2 (two) times daily., Disp: 5 mL, Rfl: 3      PMH:   Past Medical History:   Diagnosis Date    ADHD (attention deficit hyperactivity disorder)     Allergy, unspecified not elsewhere classified     IgE 2810, immunocap positive for multiple aeroallergens    Asthma, not well controlled     Asthma, well controlled  "    Conjunctivitis, allergic     Conjunctivitis, allergic     Eczema     Patient non adherence     Rhinitis, allergic     Rhinitis, allergic        Patient Active Problem List   Diagnosis    Eczema    Attention deficit hyperactivity disorder (ADHD)    Allergic state    Patient non adherence    Rhinitis, allergic    Conjunctivitis, allergic    Allergy    Asthma, not well controlled    Asthma, well controlled    Snoring    Sleep-disordered breathing    KYLER (obstructive sleep apnea)         Past medical, family, and social history were reviewed & updated as appropriate. There are no changes unless otherwise noted.        Review of Systems   Constitutional: Negative for activity change, appetite change, fever and irritability.   HENT: Negative for rhinorrhea.    Eyes: Negative for discharge.   Respiratory: Negative for apnea, cough, choking, wheezing and stridor.    Cardiovascular: Negative for sweating with feeds and cyanosis.   Gastrointestinal: Negative for diarrhea and vomiting.   Genitourinary: Negative for decreased urine volume.   Musculoskeletal: Negative for joint swelling.   Integumentary:  Negative for color change and rash.   Neurological: Negative for seizures.   Hematological: Does not bruise/bleed easily.     Physical Exam    Pulse 86   Resp 18   Ht 5' 11" (1.803 m)   Wt 117.4 kg (258 lb 11.4 oz)   SpO2 98%   BMI 36.08 kg/m²     General: Patient is a well-nourished, in no apparent distress. Appears well hydrated.   Head: Normocephalic, atraumatic.  Eyes: Pupils equal, round and reactive to light. Extraocular muscles appear intact. Conjunctivitis noted, no purulent dicharge. No ptosis.   Ears: Clear external auditory canals. Pinnae normal is shape and contour. No pre-auricular pits or skin tags. TMs grey bilaterally. No erythema or bulging.   Nose: Normal pink mucosa, no discharge or blood visible. Normal midline septum.   Mouth: moist mucous membranes. Pharynx: Tonsils 1. Dickey tongue position 2. " Pharynx shows no erythema or ulcerations. Normal movement of soft palate. No micrognathia or retrognathia.   Neck: Grossly non-swollen. No tracheal deviation. No decrease in ROM. No lymphadenopathy, goiter or masses detected.   Chest:  No increase of accessory muscles. Lungs are clear to auscultation bilaterally. No stridor, wheezes, crackles, or rubs. Good air movement.   CV: Regular rate and rhythm. Normal S1 with normally split S2 on respiration. No murmurs, gallops or rubs. 2+ pulses. Capillary refill less than 2 sec.   Abdomen: Soft, non-tender, non-distended. Bowel signs present. No noted splenomegaly. No masses.   Extremities: Warm, no clubbing, cyanosis or edema.   Skin: maculopapular rash on upper lip.      TODAY'S LABS AND EVALUATION:    Spirometry today: normal.FeNO intermediate.    ASSESSMENT:  Pop is a 22 year old male with history of severe eczema, asthma, here for follow up. Symptoms are well controlled. I consider that his allergic conjunctivitis and eczema are part of his atopic profile and not a side effect of the medication. I encouraged Pop to discuss with A/I for further inquiries about the side effect profile of Dupixent in case there are new side effects reported that I am not aware of.    PLAN:  Continue Dupixent as prescribed  I recommended the use of the following rescue medications for asthma exacerbation:  Albuterol 4 puffs/1 vial Q4 hrs PRN cough, wheezing or dyspnea or to begin at the first start of a URI. Patient has enough refills.  Asthma action plan, spacer provided today with demonstration and educational pamphlets provided  Avoidance of precipitants    Eczema on upper lip.   -Soak and seal technique stressed  -Emolients and topical steroids per prn prescriptions    Allergic conjunctivitis  Continue patanol as prescribed    Call or return sooner if the symptoms worsen, do not improve as expected or new symptoms or problems arise.    Thank you for allowing me to assist in the  care of Pop.  Please do not hesitate to contact me if I can be of further assistance.     30 minutes of total time spent on the encounter, which includes face to face time and non-face to face time preparing to see the patient (eg, review of tests), Obtaining and/or reviewing separately obtained history, Documenting clinical information in the electronic or other health record, Independently interpreting results (not separately reported) and communicating results to the patient/family/caregiver, or Care coordination (not separately reported).    Leyden Lozada, M.D.  Pediatric Pulmonology and Sleep Medicine  Office: [phone]  Fax: [phone]  February 8, 2023       cc:    [address] [place] [address]  Clarence GOINS [id number]    Addendum  Discussed performing HSAT instead of in lab study. I will be able to interpret results.     Leyden M. Lozada-Jimenez

## 2023-02-08 NOTE — PATIENT INSTRUCTIONS
Summary    1. Asthma    Continue Dupixent as prescribed    Continue the following rescue medications:  -Albuterol MDI 4 puffs every 4 hours as needed with spacer     2.Asthma action plan and spacer education provided    3. Keep appointment with dermatology      Follow up in 4 months              Asthma Action Plan for Pop Lawler     Pulmonologist:  Dr. Leyden Lozada  Contact number:  [phone]     Rescue medication:  Albuterol  Control medication(s): Dupixent     Please bring this plan and all your medications to each visit to our office or the emergency room.    GREEN ZONE: Doing Well   No cough, wheeze, chest tightness or shortness of breath during the day or night  Can do your usual activities  If a peak flow meter is used, peak flow 80% or more of my best    Take this medication each day   Medicine How much to take When to take it   Dupixent  Every 2 weeks                           Take this medication before exercise if your asthma is exercise-induced   Medicine How much to take When to take it   Albuterol 2 puffs 15 minutes before exercise            YELLOW ZONE: Asthma is Getting Worse   Cough, wheeze, chest tightness or shortness of breath or  Waking at night due to asthma, or  Can do some, but not all, usual activities, or   If a peak flow meter is used, peak flow between 50 to 79% of my best     First:  Take rescue medication, and keep taking your GREEN ZONE medication(s)  Take Albuterol inhaler 4 puffs every 20 minutes for up to 1 hour, or  Take 1 vial(s) of nebulized Albuterol every 20 minutes for up to 1 hour    Second:  If your symptoms (and peak flow) return to the Green Zone 20 minutes after the last rescue treatment:  Continue the rescue medication every four hours for 1 or 2 days  Call your pulmonologist for continued symptoms despite this therapy    If your symptoms (and peak flow) do not return to the Green Zone 20 minutes after the last rescue treatment:  Take another dose of the  rescue medication     If available, start oral steroid as directed on the medication bottle  Call your pulmonologist  Follow RED ZONE instructions if unable to reach your pulmonologist after 20 minutes      RED ZONE: Medical Alert!   Very short of breath, or    Trouble walking or talking due to shortness of breath, or    Lips or fingernails are blue, or  Rescue medications has not helped, or  If a peak flow meter is used, peak flow less than 50% of your best    Take these actions:  Take Albuterol inhaler 8 puffs, or  Take 2 vial(s) of nebulized Albuterol   If available, start oral steroid as directed on the medication bottle  Call 911 or go to the closest emergency room NOW  Take Albuterol inhaler 8 puffs, or 2 vial(s) of nebulized Albuterol every 20 minutes until arrival by EMS or at the ER  Call your pulmonologist      Thank you for choosing our clinic.  Please read below to learn more about contacting our office.     Normal business hours are 8 AM to 5 PM Monday through Friday.     After-Hours     If you need help quickly, please call 911 or go to the nearest emergency room. If your child is sick and you need same day medical advice please call [phone].     For all other questions, the best way to contact us is My Chart. If do not have [employer], our staff can help you sign up.  [employer] messages are answered within 3 business days.     Leyden Lozada, M.D.  Pediatric Pulmonology and Sleep Staff  [facility]  Office: [phone]  Fax: [phone]

## 2023-02-09 ENCOUNTER — PATIENT MESSAGE (OUTPATIENT)
Dept: PHARMACY | Facility: CLINIC | Age: 23
End: 2023-02-09
Payer: MEDICAID

## 2023-02-13 ENCOUNTER — SPECIALTY PHARMACY (OUTPATIENT)
Dept: PHARMACY | Facility: CLINIC | Age: 23
End: 2023-02-13
Payer: MEDICAID

## 2023-02-13 NOTE — TELEPHONE ENCOUNTER
Specialty Pharmacy - Refill Coordination    Specialty Medication Orders Linked to Encounter      Flowsheet Row Most Recent Value   Medication #1 dupilumab (DUPIXENT PEN) 300 mg/2 mL PnIj (Order#[id number], Rx#[id number])            Refill Questions - Documented Responses      Flowsheet Row Most Recent Value   Patient Availability and HIPAA Verification    Does patient want to proceed with activity? Yes   HIPAA/medical authority confirmed? Yes   Refill Screening Questions    Changes to allergies? No   Changes to medications? No   New conditions since last clinic visit? No   Unplanned office visit, urgent care, ED, or hospital admission in the last 4 weeks? No   How does patient/caregiver feel medication is working? Good   Financial problems or insurance changes? No   How many doses of your specialty medications were missed in the last 4 weeks? 0   Would patient like to speak to a pharmacist? No   When does the patient need to receive the medication? 02/17/23   Refill Delivery Questions    How will the patient receive the medication? MEDRx   When does the patient need to receive the medication? 02/17/23   Shipping Address Home   Address in [facility] confirmed and updated if neccessary? Yes   Expected Copay ($) 0   Is the patient able to afford the medication copay? Yes   Payment Method zero copay   Days supply of Refill 28   Supplies needed? No supplies needed   Refill activity completed? Yes   Refill activity plan Refill scheduled   Shipment/Pickup Date: 02/15/23            Current Outpatient Medications   Medication Sig    albuterol (PROVENTIL/VENTOLIN HFA) 90 mcg/actuation inhaler Inhale 4 puffs into the lungs every 4 (four) hours as needed for Wheezing or Shortness of Breath (and/or persistent coughing). Rescue    azelastine (ASTELIN) 137 mcg (0.1 %) nasal spray 1 spray (137 mcg total) by Nasal route 2 (two) times daily. (Patient not taking: Reported on 6/23/2021)    cetirizine (ZYRTEC) 10 MG tablet Take 1  tablet (10 mg total) by mouth once daily.    chlorhexidine (PERIDEX) 0.12 % solution SMARTSIG:By Mouth    diphenhydrAMINE (BENADRYL) 25 mg capsule Take 25 mg by mouth once daily. Takes one 25mg capsule every morning    dupilumab (DUPIXENT PEN) 300 mg/2 mL PnIj Inject 300 mg into the skin every 14 (fourteen) days. (Patient not taking: Reported on 2/8/2023)    EPINEPHrine (EPIPEN 2-BRYAN) 0.3 mg/0.3 mL AtIn Inject 0.3 mLs (0.3 mg total) into the muscle once. for 1 dose    hydrocortisone 2.5 % ointment Apply topically.    montelukast (SINGULAIR) 10 mg tablet Take 1 tablet (10 mg total) by mouth every evening.    olopatadine (PATANOL) 0.1 % ophthalmic solution Place 1 drop into both eyes 2 (two) times daily.   Last reviewed on 2/8/2023 11:11 AM by Bryce Pride RN    Review of patient's allergies indicates:   Allergen Reactions    Iodine and iodide containing products     Shellfish containing products     Last reviewed on  2/8/2023 11:10 AM by Brcye Pride      Tasks added this encounter   3/10/2023 - Refill Call (Auto Added)   Tasks due within next 3 months   No tasks due.     Deandra Marte, PharmD  [facility]  [address] [place] [id number]  Phone: [phone]  Fax: [phone]

## 2023-03-10 ENCOUNTER — SPECIALTY PHARMACY (OUTPATIENT)
Dept: PHARMACY | Facility: CLINIC | Age: 23
End: 2023-03-10
Payer: MEDICAID

## 2023-03-10 NOTE — TELEPHONE ENCOUNTER
Specialty Pharmacy - Refill Coordination    Specialty Medication Orders Linked to Encounter      Flowsheet Row Most Recent Value   Medication #1 dupilumab (DUPIXENT PEN) 300 mg/2 mL PnIj (Order#[id number], Rx#[id number])            Refill Questions - Documented Responses      Flowsheet Row Most Recent Value   Patient Availability and HIPAA Verification    Does patient want to proceed with activity? Yes   HIPAA/medical authority confirmed? Yes   Relationship to patient of person spoken to? Self   Refill Screening Questions    Changes to allergies? No   Changes to medications? No   New conditions since last clinic visit? No   Unplanned office visit, urgent care, ED, or hospital admission in the last 4 weeks? No   How does patient/caregiver feel medication is working? Good   Financial problems or insurance changes? No   How many doses of your specialty medications were missed in the last 4 weeks? 0   Would patient like to speak to a pharmacist? No   When does the patient need to receive the medication? 03/17/23   Refill Delivery Questions    How will the patient receive the medication? MEDRx   When does the patient need to receive the medication? 03/17/23   Shipping Address Home   Address in [facility] confirmed and updated if neccessary? Yes   Expected Copay ($) 0   Is the patient able to afford the medication copay? Yes   Payment Method zero copay   Days supply of Refill 28   Supplies needed? No supplies needed   Refill activity completed? Yes   Refill activity plan Refill scheduled   Shipment/Pickup Date: 03/13/23            Current Outpatient Medications   Medication Sig    albuterol (PROVENTIL/VENTOLIN HFA) 90 mcg/actuation inhaler Inhale 4 puffs into the lungs every 4 (four) hours as needed for Wheezing or Shortness of Breath (and/or persistent coughing). Rescue    azelastine (ASTELIN) 137 mcg (0.1 %) nasal spray 1 spray (137 mcg total) by Nasal route 2 (two) times daily. (Patient not taking: Reported on  6/23/2021)    cetirizine (ZYRTEC) 10 MG tablet Take 1 tablet (10 mg total) by mouth once daily.    chlorhexidine (PERIDEX) 0.12 % solution SMARTSIG:By Mouth    diphenhydrAMINE (BENADRYL) 25 mg capsule Take 25 mg by mouth once daily. Takes one 25mg capsule every morning    dupilumab (DUPIXENT PEN) 300 mg/2 mL PnIj Inject 300 mg into the skin every 14 (fourteen) days. (Patient not taking: Reported on 2/8/2023)    EPINEPHrine (EPIPEN 2-BRYAN) 0.3 mg/0.3 mL AtIn Inject 0.3 mLs (0.3 mg total) into the muscle once. for 1 dose    hydrocortisone 2.5 % ointment Apply topically.    montelukast (SINGULAIR) 10 mg tablet Take 1 tablet (10 mg total) by mouth every evening.    olopatadine (PATANOL) 0.1 % ophthalmic solution Place 1 drop into both eyes 2 (two) times daily.   Last reviewed on 2/8/2023 11:11 AM by Bryce Pride RN    Review of patient's allergies indicates:   Allergen Reactions    Iodine and iodide containing products     Shellfish containing products     Last reviewed on  2/8/2023 11:10 AM by Bryce Pride      Tasks added this encounter   4/7/2023 - Refill Call (Auto Added)   Tasks due within next 3 months   No tasks due.     Lexus Perez [facility]  [address] [place] [id number]  Phone: [phone]  Fax: [phone]

## 2023-03-12 ENCOUNTER — OFFICE VISIT (OUTPATIENT)
Dept: URGENT CARE | Facility: CLINIC | Age: 23
End: 2023-03-12
Payer: MEDICAID

## 2023-03-12 VITALS
SYSTOLIC BLOOD PRESSURE: 137 MMHG | WEIGHT: 260 LBS | HEART RATE: 93 BPM | RESPIRATION RATE: 16 BRPM | HEIGHT: 71 IN | BODY MASS INDEX: 36.4 KG/M2 | DIASTOLIC BLOOD PRESSURE: 78 MMHG | OXYGEN SATURATION: 96 % | TEMPERATURE: 100 F

## 2023-03-12 DIAGNOSIS — Z11.59 SCREENING FOR VIRAL DISEASE: Primary | ICD-10-CM

## 2023-03-12 DIAGNOSIS — U07.1 COVID-19 VIRUS INFECTION: ICD-10-CM

## 2023-03-12 LAB
CTP QC/QA: YES
SARS-COV-2 AG RESP QL IA.RAPID: POSITIVE

## 2023-03-12 PROCEDURE — 87811 SARS-COV-2 COVID19 W/OPTIC: CPT | Mod: QW,S$GLB,, | Performed by: FAMILY MEDICINE

## 2023-03-12 PROCEDURE — 87811 SARS CORONAVIRUS 2 ANTIGEN POCT, MANUAL READ: ICD-10-PCS | Mod: QW,S$GLB,, | Performed by: FAMILY MEDICINE

## 2023-03-12 PROCEDURE — 99214 OFFICE O/P EST MOD 30 MIN: CPT | Mod: S$GLB,,, | Performed by: FAMILY MEDICINE

## 2023-03-12 PROCEDURE — 99214 PR OFFICE/OUTPT VISIT, EST, LEVL IV, 30-39 MIN: ICD-10-PCS | Mod: S$GLB,,, | Performed by: FAMILY MEDICINE

## 2023-03-12 RX ORDER — NAPROXEN 500 MG/1
500 TABLET ORAL 2 TIMES DAILY WITH MEALS
Qty: 20 TABLET | Refills: 0 | Status: SHIPPED | OUTPATIENT
Start: 2023-03-12

## 2023-03-12 RX ORDER — DEXTROMETHORPHAN HBR, GUAIFENESIN AND PSEUDOEPHEDRINE HCL 60; 380; 20 MG/1; MG/1; MG/1
1 TABLET ORAL EVERY 6 HOURS PRN
Qty: 20 TABLET | Refills: 0 | Status: SHIPPED | OUTPATIENT
Start: 2023-03-12

## 2023-03-12 NOTE — LETTER
March 12, 2023  Pop Lawler  [id number]  [place] [address]                [place] [facility]  [address], [address]  [place] [address]  Phone: [phone]  Fax: [phone] Pop Lawler was seen and treated in our Urgent Care department on 3/12/2023. He may return to work in 2 - 3 days.      If you have any questions or concerns, please don't hesitate to call.        Sincerely,        Theo Jennings MD

## 2023-03-12 NOTE — PATIENT INSTRUCTIONS
Please drink plenty of fluids.  Please get plenty of rest.  Please return here or go to the Emergency Department for any concerns or worsening of condition.    You have tested positive for COVID-19 today.  Please note that patients who test positive for COVID-19 are required by the CDC to undergo isolation for 5 days after their symptoms first began, followed by a 5 day period of strict Mask wearing.  This isolation starts from the day you first developed symptoms, not the day of your positive test. For example, if your symptoms began on a Monday but tested positive on the following Wednesday, your 5 day isolation begins from that Monday, not the Wednesday you tested positive.  However, if you are asymptomatic (a person who does not have any symptoms) and COVID-19 positive, your 5 day isolation begins on the day you tested positive, regardless of exposure date.  Also, per the CDC guidelines, once your 5 days have passed, and you have not had fever greater than 100.4F in the last 24 hours without taking any fever reducers such as Tylenol (Acetaminophen) or Motrin (Ibuprofen), you may return to your normal activities including social distancing, wearing masks, and frequent handwashing - YOU DO NOT NEED ANOTHER TEST IN ORDER TO END YOUR QUARANTINE.     If you were prescribed a narcotic medication, do not drive or operate heavy equipment or machinery while taking these medications.    You were given a decongestant (RESCON or POLY VENT Dm).  If your insurance does not cover it or you cannot afford it, it is ok to use the over the counter products listed below.  If you do not have Hypertension or any history of palpitations, it is ok to take over the counter Sudafed or Mucinex D or Allegra-D or Claritin-D or Zyrtec-D.  If you do take one of the above, it is ok to combine that with plain over the counter Mucinex or Allegra or Claritin or Zyrtec.  If for example you are taking Zyrtec -D, you can combine that with Mucinex,  but not Mucinex-D.  If you are taking Mucinex-D, you can combine that with plain Allegra or Claritin or Zyrtec.   If you do have Hypertension or palpitations, it is safe to take Coricidin HBP for relief of sinus symptoms.    We recommend you take over the counter Flonase (Fluticasone) or another nasally inhaled steroid unless you are already taking one.  Nasal irrigation with a saline spray or Netti Pot like device per their directions is also recommended.  If not allergic, please take over the counter Tylenol (Acetaminophen) and/or Motrin (Ibuprofen) as directed for control of pain and/or fever.    We recommend Vitamin C (1000mg daily), Vitamin d3 (125mcg daily), and Zinc (100mg daily) to help combat the Coronavirus.    Robitussin DM 2 teas every 4 hours as needed for cough.  If you  smoke, please stop smoking.    Please follow up with your primary care doctor or specialist as needed.  Felipa Harris MD  [phone]    Covid risk Score 3    You must understand that you have received treatment at an Urgent Care facility only, and that you may be  released before all of your medical problems are known or treated. Urgent Care facilities are not equipped to  handle life threatening emergencies. It is recommended that you seek care at an Emergency Department for  further evaluation of worsening or concerning symptoms, or possibly life threatening conditions as  Discussed.    Instructions for Patients with Confirmed or Suspected COVID-19    If you are awaiting your test result, you will either be called or it will be released to the patient portal.  If you have any questions about your test, please visit [email or web address] or call our COVID-19 information line at [phone].      Instructions for non-hospitalized or discharged patients with confirmed or suspected COVID-19:      Stay home except to get medical care.   Separate yourself from other people and animals in your home.   Call ahead before  visiting your doctor.   Wear a face mask.   Cover your coughs and sneezes.   Clean your hands often.   Avoid sharing personal household items.   Clean all high-touch surfaces every day.   Monitor your symptoms. Seek prompt medical attention if your illness is worsening (e.g., difficulty breathing). Before seeking care, call your healthcare provider.   If you have a medical emergency and must call 911, notify the dispatcher that you have or are being evaluated for COVID-19. If possible, put on a face mask before emergency medical services arrive.   Use the following symptom-based strategy to return to normal activity following a suspected or confirmed case of COVID-19. Continue isolation until:   At least 1 days (24 hours) have passed since recovery defined as resolution of fever without the use of fever-reducing medications and improvement in respiratory symptoms (e.g. cough, shortness of breath), and   At least 5 days have passed since the first positive test.       As one of the next steps, you will receive a call or text from the [place] Department of Health ([facility]) COVID-19 Tracing Team. See the contact information below so you know not to ignore the health departments call. It is important that you contact them back immediately so they can help.     Contact Tracer Number:  [phone]  Caller ID for most carriers: [facility]    What is contact tracing?  Contact tracing is a process that helps identify everyone who has been in close contact with an infected person. Contact tracers let those people know they may have been exposed and guide them on next steps. Confidentiality is important for everyone; no one will be told who may have exposed them to the virus.  Your involvement is important. The more we know about where and how this virus is spreading, the better chance we have at stopping it from spreading further.  What does exposure mean?  Exposure means you have been within 6 feet for more than 15  minutes with a person who has or had COVID-19.  What kind of questions do the contact tracers ask?  A contact tracer will confirm your basic contact information including name, address, phone number, and next of kin, as well as asking about any symptoms you may have had. Theyll also ask you how you think you may have gotten sick, such as places where you may have been exposed to the virus, and people you were with. Those names will never be shared with anyone outside of that call, and will only be used to help trace and stop the spread of the virus.   I have privacy concerns. How will the state use my information?  Your privacy about your health is important. All calls are completed using call centers that use the appropriate health privacy protection measures (HIPAA compliance), meaning that your patient information is safe. No one will ever ask you any questions related to immigration status. Your health comes first.   Do I have to participate?  You do not have to participate, but we strongly encourage you to. Contact tracing can help us catch and control new outbreaks as theyre developing to keep your friends and family safe.   What if I dont hear from anyone?  If you dont receive a call within 24 hours, you can call the number above right away to inquire about your status. That line is open from 8:00 am - 8:00 p.m., 7 days a week.  Contact tracing saves lives! Together, we have the power to beat this virus and keep our loved ones and neighbors safe.       Instructions for household members, intimate partners and caregivers in a non-healthcare setting of a patient with confirmed or suspected COVID-19:        Close contacts should monitor their health and call their healthcare provider right away if they develop symptoms suggestive of COVID-19 (e.g., fever, cough, shortness of breath).   Stay home except to get medical care. Separate yourself from other people and animals in the home.  Monitor the patients  symptoms. If the patient is getting sicker, call his or her healthcare provider. If the patient has a medical emergency and you need to call 911, notify the dispatch personnel that the patient has or is being evaluated for COVID-19.   Wear a facemask when around other people such as sharing a room or vehicle and before entering a healthcare provider's office.  Cover coughs and sneezes with a tissue. Throw used tissues in a lined trash can immediately and wash hands.  Clean hands often with soap and water for at least 20 seconds or with an alcohol-based hand , rubbing hands together until they feel dry. Avoid touching your eyes, nose, and mouth with unwashed hands.  Clean all high-touch; surfaces every day, including counters, tabletops, doorknobs, bathroom fixtures, toilets, phones, keyboards, tablets, bedside tables, etc. Use a household cleaning spray or wipe according to label instructions.  Avoid sharing personal household items such as dishes, drinking glasses, cups, towels, bedding, etc. After these items are used, they should be washed thoroughly with soap and water.  Continue isolation until:  At least 1 days (72 hours) have passed since recovery defined as resolution of fever without the use of fever-reducing medications and improvement in respiratory symptoms (e.g. cough, shortness of breath), and   At least 5 days have passed since the patients first positive test.    https://www.cdc.gov/coronavirus/2019-ncov/your-health/index.htm

## 2023-03-12 NOTE — PROGRESS NOTES
"Subjective:       Patient ID: Pop Lawler is a 22 y.o. male.    Vitals:  height is 5' 11" (1.803 m) and weight is 117.9 kg (260 lb). His oral temperature is 99.7 °F (37.6 °C). His blood pressure is 137/78 and his pulse is 93. His respiration is 16 and oxygen saturation is 96%.     Chief Complaint: Cough (PT presents today with nonproductive cough, headache, and sinus drainage x 1 day. )    Cough  This is a new problem. The current episode started yesterday. The problem has been gradually worsening. The problem occurs constantly. The cough is Non-productive. Associated symptoms include headaches and shortness of breath. Nothing aggravates the symptoms. Treatments tried: mucinex, The treatment provided no relief. His past medical history is significant for asthma.     Constitution: Negative.   HENT: Negative.     Cardiovascular: Negative.    Eyes: Negative.    Respiratory:  Positive for cough and shortness of breath.    Gastrointestinal: Negative.    Endocrine: negative.   Genitourinary: Negative.    Musculoskeletal: Negative.    Skin: Negative.    Allergic/Immunologic: Negative.    Neurological:  Positive for headaches.   Hematologic/Lymphatic: Negative.    Psychiatric/Behavioral: Negative.       Objective:      Physical Exam   Constitutional: He is oriented to person, place, and time. He appears well-developed. He is cooperative.  Non-toxic appearance. He does not appear ill. No distress.   HENT:   Head: Normocephalic and atraumatic.   Ears:   Right Ear: Hearing, tympanic membrane, external ear and ear canal normal.   Left Ear: Hearing, tympanic membrane, external ear and ear canal normal.   Nose: Nose normal. No mucosal edema, rhinorrhea or nasal deformity. No epistaxis. Right sinus exhibits no maxillary sinus tenderness and no frontal sinus tenderness. Left sinus exhibits no maxillary sinus tenderness and no frontal sinus tenderness.   Mouth/Throat: Uvula is midline, oropharynx is clear and moist and mucous " membranes are normal. No trismus in the jaw. Normal dentition. No uvula swelling. No oropharyngeal exudate, posterior oropharyngeal edema or posterior oropharyngeal erythema.   Eyes: Conjunctivae and lids are normal. No scleral icterus.   Neck: Trachea normal and phonation normal. Neck supple. No edema present. No erythema present. No neck rigidity present.   Cardiovascular: Normal rate, regular rhythm, normal heart sounds and normal pulses.   Pulmonary/Chest: Effort normal and breath sounds normal. No respiratory distress. He has no decreased breath sounds. He has no rhonchi.   Abdominal: Normal appearance.   Musculoskeletal: Normal range of motion.         General: No deformity. Normal range of motion.   Neurological: He is alert and oriented to person, place, and time. He exhibits normal muscle tone. Coordination normal.   Skin: Skin is warm, dry, intact, not diaphoretic and not pale.   Psychiatric: His speech is normal and behavior is normal. Judgment and thought content normal.   Nursing note and vitals reviewed.      Results for orders placed or performed in visit on 03/12/23   SARS Coronavirus 2 Antigen, POCT Manual Read   Result Value Ref Range    SARS Coronavirus 2 Antigen Positive (A) Negative     Acceptable Yes        Assessment:       1. Screening for viral disease    2. COVID-19 virus infection          Plan:         Screening for viral disease  -     SARS Coronavirus 2 Antigen, POCT Manual Read    COVID-19 virus infection  -     pseudoephedrine-DM-guaiFENesin (POLY-VENT DM) 60- mg Tab; Take 1 tablet by mouth every 6 (six) hours as needed.  Dispense: 20 tablet; Refill: 0  -     naproxen (NAPROSYN) 500 MG tablet; Take 1 tablet (500 mg total) by mouth 2 (two) times daily with meals.  Dispense: 20 tablet; Refill: 0     Covid risk Score 3    Please drink plenty of fluids.  Please get plenty of rest.  Please return here or go to the Emergency Department for any concerns or worsening of  condition.    You have tested positive for COVID-19 today.  Please note that patients who test positive for COVID-19 are required by the CDC to undergo isolation for 5 days after their symptoms first began, followed by a 5 day period of strict mask wearing.  This isolation starts from the day you first developed symptoms, not the day of your positive test. For example, if your symptoms began on a Monday but tested positive on the following Wednesday, your 5-day isolation begins from that Monday, not the Wednesday you tested positive.  However, if you are asymptomatic (a person who does not have any symptoms) and COVID-19 positive, your 5-day isolation begins on the day you tested positive, regardless of exposure date.  Also, per the CDC guidelines, once your 5 days have passed, and you have not had fever greater than 100.4F in the last 24 hours without taking any fever reducers such as Tylenol (Acetaminophen) or Motrin (Ibuprofen), you may return to your normal activities including social distancing, wearing masks, and frequent handwashing - YOU DO NOT NEED ANOTHER TEST IN ORDER TO END YOUR QUARANTINE.     If you were prescribed a narcotic medication, do not drive or operate heavy equipment or machinery while taking these medications.    You were given a decongestant (RESCON or POLY VENT Dm).  If your insurance does not cover it or you cannot afford it, it is ok to use the over the counter products listed below.  If you do not have Hypertension or any history of palpitations, it is ok to take over the counter Sudafed or Mucinex D or Allegra-D or Claritin-D or Zyrtec-D.  If you do take one of the above, it is ok to combine that with plain over the counter Mucinex or Allegra or Claritin or Zyrtec.  If for example you are taking Zyrtec -D, you can combine that with Mucinex, but not Mucinex-D.  If you are taking Mucinex-D, you can combine that with plain Allegra or Claritin or Zyrtec.   If you do have Hypertension or  palpitations, it is safe to take Coricidin HBP for relief of sinus symptoms.    We recommend you take over the counter Flonase (Fluticasone) or another nasally inhaled steroid unless you are already taking one.  Nasal irrigation with a saline spray or Netti Pot like device per their directions is also recommended.  If not allergic, please take over the counter Tylenol (Acetaminophen) and/or Motrin (Ibuprofen) as directed for control of pain and/or fever.    We recommend Vitamin C (1000mg daily), Vitamin d3 (125mcg daily), and Zinc (100mg daily) to help combat the Coronavirus.    Robitussin DM 2 teas every 4 hours as needed for cough.  If you  smoke, please stop smoking.    Please follow up with your primary care doctor or specialist as needed.  Felipa Harris MD  [phone]      You must understand that you have received treatment at an Urgent Care facility only, and that you may be  released before all of your medical problems are known or treated. Urgent Care facilities are not equipped to  handle life threatening emergencies. It is recommended that you seek care at an Emergency Department for  further evaluation of worsening or concerning symptoms, or possibly life threatening conditions as  Discussed.

## 2023-03-12 NOTE — LETTER
[address] [facility] [address] ? [place], [address] ? Phone [phone] ? Fax [phone]           Return to Work/School Status    Patient: Pop Lawler  YOB: 2000  Date: March 12, 2023      [facility] has adopted CDCs time-based return to work/school strategy for persons with confirmed or suspected COVID19. [facility] does not recommend using a test-based strategy for returning to work/school after COVID-19 infection. [facility] has reported prolonged positive test results without evidence of infectiousness. At this time, positive specimens capable of producing disease have not been isolated more than 9 days after onset of illness.   Symptomatic persons with confirmed COVID-19 or suspected COVID-19 can return to work/school after:   At least 1 days (24 hours) have passed since recovery defined as resolution of fever without the use of fever-reducing medications AND improvement in respiratory symptoms (e.g., cough, shortness of breath)   AND, at least 5 days have passed since symptoms first develop.  Asymptomatic persons with confirmed COVID-19 can return to work after:  At least 5 days have passed since the positive laboratory test and the person remains asymptomatic.  More information about the science behind the symptom-based return to work/school can be found at: https://www.cdc.gov/coronavirus/2019-ncov/community/uuqlxbmy-nslmufrmfrm-lacvjfnqs.html    Sincerely,    Theo Jennings MD

## 2023-03-13 ENCOUNTER — HOSPITAL ENCOUNTER (EMERGENCY)
Facility: HOSPITAL | Age: 23
Discharge: HOME | End: 2023-03-13
Attending: EMERGENCY MEDICINE
Payer: MEDICAID

## 2023-03-13 ENCOUNTER — TELEPHONE (OUTPATIENT)
Dept: URGENT CARE | Facility: CLINIC | Age: 23
End: 2023-03-13
Payer: MEDICAID

## 2023-03-13 VITALS
TEMPERATURE: 97 F | OXYGEN SATURATION: 98 % | HEIGHT: 71 IN | BODY MASS INDEX: 36.62 KG/M2 | WEIGHT: 261.56 LBS | RESPIRATION RATE: 20 BRPM | DIASTOLIC BLOOD PRESSURE: 77 MMHG | SYSTOLIC BLOOD PRESSURE: 136 MMHG | HEART RATE: 94 BPM

## 2023-03-13 DIAGNOSIS — U07.1 COVID: Primary | ICD-10-CM

## 2023-03-13 DIAGNOSIS — J45.901 EXACERBATION OF ASTHMA, UNSPECIFIED ASTHMA SEVERITY, UNSPECIFIED WHETHER PERSISTENT: ICD-10-CM

## 2023-03-13 DIAGNOSIS — R06.00 DYSPNEA: ICD-10-CM

## 2023-03-13 PROCEDURE — 96372 THER/PROPH/DIAG INJ SC/IM: CPT | Performed by: EMERGENCY MEDICINE

## 2023-03-13 PROCEDURE — 99284 EMERGENCY DEPT VISIT MOD MDM: CPT | Mod: 25

## 2023-03-13 PROCEDURE — 63600175 PHARM REV CODE 636 W HCPCS: Performed by: EMERGENCY MEDICINE

## 2023-03-13 RX ORDER — METHYLPREDNISOLONE 4 MG/1
TABLET ORAL
Qty: 21 EACH | Refills: 0 | Status: SHIPPED | OUTPATIENT
Start: 2023-03-13 | End: 2023-04-03

## 2023-03-13 RX ORDER — METHYLPREDNISOLONE SOD SUCC 125 MG
125 VIAL (EA) INJECTION ONCE
Status: COMPLETED | OUTPATIENT
Start: 2023-03-13 | End: 2023-03-13

## 2023-03-13 RX ORDER — ALBUTEROL SULFATE 90 UG/1
4 AEROSOL, METERED RESPIRATORY (INHALATION) ONCE
Status: DISCONTINUED | OUTPATIENT
Start: 2023-03-13 | End: 2023-03-13

## 2023-03-13 RX ADMIN — METHYLPREDNISOLONE SODIUM SUCCINATE 125 MG: 125 INJECTION, POWDER, FOR SOLUTION INTRAMUSCULAR; INTRAVENOUS at 01:03

## 2023-03-13 NOTE — TELEPHONE ENCOUNTER
Called back patient for follow up visit yesterday for Covid infection.  Patient states he is feeling better, symptoms improving with treatment.  Recommended continuing medications as needed and finish isolation period.  Follow up with us if symptoms persist or worsen.

## 2023-03-13 NOTE — ED PROVIDER NOTES
Encounter Date: 3/13/2023       History     Chief Complaint   Patient presents with    Shortness of Breath     Diagnosed with COVID today. Hx of asthma. Increased shortness of breath for the last two hours. Frequent cough.  Albuterol inhaler not working. Nebulized albuterol at home also not working.     23 YO MALE WHO COMES IN TODAY DUE TO DYSPNEA.  THE PATIENT WAS DIAGNOSED WITH COVID ON THIS PAST WEEKEND.  HE ALSO ADMITS TO A HISTORY OF ASTHMA.  HE STATES THAT HE HAS TRIED HIS ALBUTEROL NEBS AT HOME WITH NO IMPROVEMENT IN SYMPTOMS.        Review of patient's allergies indicates:   Allergen Reactions    Iodine and iodide containing products     Shellfish containing products      Past Medical History:   Diagnosis Date    ADHD (attention deficit hyperactivity disorder)     Allergy, unspecified not elsewhere classified     IgE 2810, immunocap positive for multiple aeroallergens    Asthma, not well controlled     Asthma, well controlled     Conjunctivitis, allergic     Conjunctivitis, allergic     Eczema     Patient non adherence     Rhinitis, allergic     Rhinitis, allergic      History reviewed. No pertinent surgical history.  Family History   Problem Relation Age of Onset    Asthma Mother     No Known Problems Father      Social History     Tobacco Use    Smoking status: Never    Smokeless tobacco: Never   Substance Use Topics    Alcohol use: No    Drug use: No     Review of Systems   Constitutional: Negative.    HENT: Negative.     Eyes: Negative.    Respiratory:  Positive for cough, chest tightness and shortness of breath.    Cardiovascular: Negative.    Gastrointestinal: Negative.    Genitourinary: Negative.    Musculoskeletal: Negative.    Skin: Negative.    Neurological: Negative.    Hematological: Negative.    Psychiatric/Behavioral: Negative.       Physical Exam     Initial Vitals [03/13/23 0056]   BP Pulse Resp Temp SpO2   139/78 107 (!) 22 97.1 °F (36.2 °C) 98 %      MAP       --         Physical  Exam    Nursing note and vitals reviewed.  Constitutional: He appears well-developed and well-nourished.   HENT:   Head: Normocephalic and atraumatic.   Eyes: EOM are normal. Pupils are equal, round, and reactive to light.   Neck: Neck supple.   Normal range of motion.  Cardiovascular:            TACHYCARDIC    Pulmonary/Chest:   CHEST TIGHTNESS WITH DIMINISHED BREATH SOUNDS BILATERALLY    Abdominal: Abdomen is soft.   Musculoskeletal:         General: Normal range of motion.      Cervical back: Normal range of motion and neck supple.     Neurological: He is alert and oriented to person, place, and time.   Skin: Skin is warm.   Psychiatric: He has a normal mood and affect.       ED Course   Procedures  Labs Reviewed - No data to display       Imaging Results              X-Ray Chest AP Portable (In process)                   X-Rays:   Independently Interpreted Readings:   Other Readings:  CHEST RADIOGRAPH IS PENDING.    CHEST RADIOGRAPH IS NEGATIVE FOR ANY ACUTE PATHOLOGY.   Medications   methylPREDNISolone sodium succinate injection 125 mg (125 mg Intramuscular Given 3/13/23 0117)     Medical Decision Making:   Differential Diagnosis:   ASTHMA EXACERBATION, PNEUMONIA, COVID   23 YO MALE WHO COMES IN TODAY DUE TO INCREASING SHORTNESS OF BREATH AND COUGH.    HE DOES ADMIT TO A RECENT DIAGNOSIS OF COVID.  HIS EVALUATION IS PENDING.     PATIENT STATES THAT HE FEELS MUCH BETTER AFTER SOLUMEDROL AND THE ALBUTEROL   TREATMENT.  PLAN TO DISCHARGE HOME WHEN HE IS IMPROVED.                         Clinical Impression:   Final diagnoses:  [R06.00] Dyspnea  [U07.1] COVID (Primary)  [J45.901] Exacerbation of asthma, unspecified asthma severity, unspecified whether persistent        ED Disposition Condition    Discharge Stable          ED Prescriptions       Medication Sig Dispense Start Date End Date Auth. Provider    methylPREDNISolone (MEDROL DOSEPACK) 4 mg tablet use as directed 23 each 3/13/2023 4/3/2023 Mirella Beard MD           Follow-up Information       Follow up With Specialties Details Why Contact Info    Felipa Harris MD Pediatrics  As needed [address] [facility] [address]  [phone]               Mirella Beard MD  03/13/23 6865

## 2023-04-10 ENCOUNTER — SPECIALTY PHARMACY (OUTPATIENT)
Dept: PHARMACY | Facility: CLINIC | Age: 23
End: 2023-04-10
Payer: MEDICAID

## 2023-04-10 NOTE — TELEPHONE ENCOUNTER
Specialty Pharmacy - Refill Coordination    Specialty Medication Orders Linked to Encounter      Flowsheet Row Most Recent Value   Medication #1 dupilumab (DUPIXENT PEN) 300 mg/2 mL PnIj (Order#[id number], Rx#[id number])          Refill Questions - Documented Responses      Flowsheet Row Most Recent Value   Patient Availability and HIPAA Verification    Does patient want to proceed with activity? Yes   HIPAA/medical authority confirmed? Yes   Relationship to patient of person spoken to? Self   Refill Screening Questions    Changes to allergies? No   Changes to medications? No   New conditions since last clinic visit? No   Unplanned office visit, urgent care, ED, or hospital admission in the last 4 weeks? No   How does patient/caregiver feel medication is working? Excellent   Financial problems or insurance changes? No   How many doses of your specialty medications were missed in the last 4 weeks? 0   Would patient like to speak to a pharmacist? No   When does the patient need to receive the medication? 04/14/23   Refill Delivery Questions    How will the patient receive the medication? MEDRx   When does the patient need to receive the medication? 04/14/23   Shipping Address Home   Address in [facility] confirmed and updated if neccessary? Yes   Expected Copay ($) 0   Is the patient able to afford the medication copay? Yes   Payment Method zero copay   Days supply of Refill 28   Supplies needed? Injection Device   Refill activity completed? Yes   Refill activity plan Refill scheduled   Shipment/Pickup Date: 04/11/23            Current Outpatient Medications   Medication Sig    albuterol (PROVENTIL/VENTOLIN HFA) 90 mcg/actuation inhaler Inhale 4 puffs into the lungs every 4 (four) hours as needed for Wheezing or Shortness of Breath (and/or persistent coughing). Rescue (Patient not taking: Reported on 3/12/2023)    azelastine (ASTELIN) 137 mcg (0.1 %) nasal spray 1 spray (137 mcg total) by Nasal route 2 (two)  times daily. (Patient not taking: Reported on 6/23/2021)    cetirizine (ZYRTEC) 10 MG tablet Take 1 tablet (10 mg total) by mouth once daily.    chlorhexidine (PERIDEX) 0.12 % solution SMARTSIG:By Mouth    diphenhydrAMINE (BENADRYL) 25 mg capsule Take 25 mg by mouth once daily. Takes one 25mg capsule every morning    dupilumab (DUPIXENT PEN) 300 mg/2 mL PnIj Inject 300 mg into the skin every 14 (fourteen) days. (Patient not taking: Reported on 2/8/2023)    EPINEPHrine (EPIPEN 2-BRYAN) 0.3 mg/0.3 mL AtIn Inject 0.3 mLs (0.3 mg total) into the muscle once. for 1 dose    hydrocortisone 2.5 % ointment Apply topically.    montelukast (SINGULAIR) 10 mg tablet Take 1 tablet (10 mg total) by mouth every evening. (Patient not taking: Reported on 3/12/2023)    naproxen (NAPROSYN) 500 MG tablet Take 1 tablet (500 mg total) by mouth 2 (two) times daily with meals.    olopatadine (PATANOL) 0.1 % ophthalmic solution Place 1 drop into both eyes 2 (two) times daily. (Patient not taking: Reported on 3/12/2023)    pseudoephedrine-DM-guaiFENesin (POLY-VENT DM) 60- mg Tab Take 1 tablet by mouth every 6 (six) hours as needed.   Last reviewed on 3/12/2023 10:59 AM by Theo Jennings MD    Review of patient's allergies indicates:   Allergen Reactions    Iodine and iodide containing products     Shellfish containing products     Last reviewed on  3/13/2023 12:57 AM by Hermelinda Duggan      Tasks added this encounter   5/5/2023 - Refill Call (Auto Added)   Tasks due within next 3 months   No tasks due.     Al Camp, PharmD  [facility] - Specialty Pharmacy  [address] [place] [address]  Phone: [phone]  Fax: [phone]

## 2023-04-18 NOTE — PLAN OF CARE
01/30/17 1454   Discharge Assessment   Assessment Type Discharge Planning Assessment   Confirmed/corrected address and phone number on facesheet? Yes   Assessment information obtained from? Caregiver   Expected Length of Stay (days) 3   Communicated expected length of stay with patient/caregiver yes   Prior to hospitilization cognitive status: Alert/Oriented   Prior to hospitalization functional status: Adolescent   Current cognitive status: Alert/Oriented   Current Functional Status: Adolescent   Lives With parent(s);sibling(s);other relative(s)   Able to Return to Prior Arrangements yes   Is patient able to care for self after discharge? Patient is of pediatric age   How many people do you have in your home that can help with your care after discharge? 3   Who are your caregiver(s) and their phone number(s)? mtr Tracey Robbinslice , Jessica carcamo , aunt Jeannie    Readmission Within The Last 30 Days no previous admission in last 30 days   Patient currently being followed by outpatient case management? No   Patient currently receives home health services? No   Does the patient currently use HME? No   Equipment Currently Used at Home other (see comments)  (nebulizer)   Do you have any problems affording any of your prescribed medications? No   Is the patient taking medications as prescribed? no   If no, which medications is patient not taking? noncompliant   Do you have any financial concerns preventing you from receiving the healthcare you need? No   Does the patient have [facility]ation to healthcare appointments? Yes   Transportation Available family or friend will provide   On Dialysis? No   Does the patient receive services at the Coumadin Clinic? No   Are there any open cases? No   Discharge Plan A Home with family   Patient/Family In Agreement With Plan yes     Sw met with pt and his aunt at pts bedside. The role of Sw was explained and pts aunt verbalized understanding.  [phone]     Pt lives at home with his mtr Tracey, saminaftjenna Lopez, 4 brothers ages 6, 3, 3, 4 and a 7yo sis. Pts aunt, Jeannie, also lives in the home. Pts mtr is employed with [employer] and pts stepftr is employed in the construction industry. Pts bio ftr is not involved with pt. Pt attends [employer] and is in 8th grade.   Sw spoke with pts aunt about concern for noncompliance. Pts aunt stated that the family has 1 car but it isnt in great shape - Sw explained that [employer] [facility] can be used to get pt back and forth to appts. Sw also explained the importance of med compliance, not doing so can actually kill pt. Pts aunt verbalized understanding. Sw told pts aunt that if noncompliance and no shows continue, DCFS may need to get involved, which is something we would prefer to not do but if it is necessary, it has to be done. Pts aunt verbalized understanding. Glenn also stated that I would speak with pts mtr and explain the above to her as well. Glenn provided pts aunt the number to [employer] - [phone].  Glenn contacted pts mtr and spoke with her about the concerns of noncompliance and she verbalized understanding. Glenn provided her with the number to [employer] and she stated appreciation. No further known needs identified at this time.     Pt lives at [address] [place], [place] [id number]

## 2023-05-05 ENCOUNTER — SPECIALTY PHARMACY (OUTPATIENT)
Dept: PHARMACY | Facility: CLINIC | Age: 23
End: 2023-05-05
Payer: MEDICAID

## 2023-05-05 NOTE — TELEPHONE ENCOUNTER
Specialty Pharmacy - Refill Coordination    Specialty Medication Orders Linked to Encounter      Flowsheet Row Most Recent Value   Medication #1 dupilumab (DUPIXENT PEN) 300 mg/2 mL PnIj (Order#[id number], Rx#[id number])            Refill Questions - Documented Responses      Flowsheet Row Most Recent Value   Refill Screening Questions    Changes to allergies? No   Changes to medications? No   New conditions since last clinic visit? No   Unplanned office visit, urgent care, ED, or hospital admission in the last 4 weeks? No   How does patient/caregiver feel medication is working? Good   Financial problems or insurance changes? No   How many doses of your specialty medications were missed in the last 4 weeks? 0   Would patient like to speak to a pharmacist? No   When does the patient need to receive the medication? 05/12/23   Refill Delivery Questions    How will the patient receive the medication? MEDRx   When does the patient need to receive the medication? 05/12/23   Shipping Address Home   Address in [facility] confirmed and updated if neccessary? Yes   Expected Copay ($) 0   Is the patient able to afford the medication copay? Yes   Payment Method zero copay   Days supply of Refill 28   Supplies needed? No supplies needed   Refill activity completed? Yes   Refill activity plan Refill scheduled   Shipment/Pickup Date: 05/08/23            Current Outpatient Medications   Medication Sig    albuterol (PROVENTIL/VENTOLIN HFA) 90 mcg/actuation inhaler Inhale 4 puffs into the lungs every 4 (four) hours as needed for Wheezing or Shortness of Breath (and/or persistent coughing). Rescue (Patient not taking: Reported on 3/12/2023)    azelastine (ASTELIN) 137 mcg (0.1 %) nasal spray 1 spray (137 mcg total) by Nasal route 2 (two) times daily. (Patient not taking: Reported on 6/23/2021)    cetirizine (ZYRTEC) 10 MG tablet Take 1 tablet (10 mg total) by mouth once daily.    chlorhexidine (PERIDEX) 0.12 % solution  SMARTSIG:By Mouth    diphenhydrAMINE (BENADRYL) 25 mg capsule Take 25 mg by mouth once daily. Takes one 25mg capsule every morning    dupilumab (DUPIXENT PEN) 300 mg/2 mL PnIj Inject 300 mg into the skin every 14 (fourteen) days. (Patient not taking: Reported on 2/8/2023)    EPINEPHrine (EPIPEN 2-BRYAN) 0.3 mg/0.3 mL AtIn Inject 0.3 mLs (0.3 mg total) into the muscle once. for 1 dose    hydrocortisone 2.5 % ointment Apply topically.    montelukast (SINGULAIR) 10 mg tablet Take 1 tablet (10 mg total) by mouth every evening. (Patient not taking: Reported on 3/12/2023)    naproxen (NAPROSYN) 500 MG tablet Take 1 tablet (500 mg total) by mouth 2 (two) times daily with meals.    olopatadine (PATANOL) 0.1 % ophthalmic solution Place 1 drop into both eyes 2 (two) times daily. (Patient not taking: Reported on 3/12/2023)    pseudoephedrine-DM-guaiFENesin (POLY-VENT DM) 60- mg Tab Take 1 tablet by mouth every 6 (six) hours as needed.   Last reviewed on 3/12/2023 10:59 AM by Theo Jennings MD    Review of patient's allergies indicates:   Allergen Reactions    Iodine and iodide containing products     Shellfish containing products     Last reviewed on  3/13/2023 12:57 AM by Hermelinda Duggan      Tasks added this encounter   No tasks added.   Tasks due within next 3 months   5/5/2023 - Refill Coordination Outreach (1 time occurrence)     Halle Marcus  [facility]  [address] [place] [address]  Phone: [phone]  Fax: [phone]

## 2023-05-29 ENCOUNTER — PATIENT MESSAGE (OUTPATIENT)
Dept: PHARMACY | Facility: CLINIC | Age: 23
End: 2023-05-29
Payer: MEDICAID

## 2023-06-01 ENCOUNTER — SPECIALTY PHARMACY (OUTPATIENT)
Dept: PHARMACY | Facility: CLINIC | Age: 23
End: 2023-06-01
Payer: MEDICAID

## 2023-06-01 NOTE — TELEPHONE ENCOUNTER
Specialty Pharmacy - Refill Coordination    Specialty Medication Orders Linked to Encounter      Flowsheet Row Most Recent Value   Medication #1 dupilumab (DUPIXENT PEN) 300 mg/2 mL PnIj (Order#[id number], Rx#[id number])            Refill Questions - Documented Responses      Flowsheet Row Most Recent Value   Refill Screening Questions    Changes to allergies? No   Changes to medications? No   New conditions since last clinic visit? No   Unplanned office visit, urgent care, ED, or hospital admission in the last 4 weeks? No   How does patient/caregiver feel medication is working? Good   Financial problems or insurance changes? No   How many doses of your specialty medications were missed in the last 4 weeks? 0   Would patient like to speak to a pharmacist? No   When does the patient need to receive the medication? 06/08/23   Refill Delivery Questions    How will the patient receive the medication? MEDRx   When does the patient need to receive the medication? 06/08/23   Shipping Address Home   Address in [facility] confirmed and updated if neccessary? Yes   Expected Copay ($) 0   Is the patient able to afford the medication copay? Yes   Payment Method zero copay   Days supply of Refill 28   Supplies needed? No supplies needed   Refill activity completed? Yes   Refill activity plan Refill scheduled   Shipment/Pickup Date: 06/05/23            Current Outpatient Medications   Medication Sig    albuterol (PROVENTIL/VENTOLIN HFA) 90 mcg/actuation inhaler Inhale 4 puffs into the lungs every 4 (four) hours as needed for Wheezing or Shortness of Breath (and/or persistent coughing). Rescue (Patient not taking: Reported on 3/12/2023)    azelastine (ASTELIN) 137 mcg (0.1 %) nasal spray 1 spray (137 mcg total) by Nasal route 2 (two) times daily. (Patient not taking: Reported on 6/23/2021)    cetirizine (ZYRTEC) 10 MG tablet Take 1 tablet (10 mg total) by mouth once daily.    chlorhexidine (PERIDEX) 0.12 % solution  SMARTSIG:By Mouth    diphenhydrAMINE (BENADRYL) 25 mg capsule Take 25 mg by mouth once daily. Takes one 25mg capsule every morning    dupilumab (DUPIXENT PEN) 300 mg/2 mL PnIj Inject 300 mg into the skin every 14 (fourteen) days. (Patient not taking: Reported on 2/8/2023)    EPINEPHrine (EPIPEN 2-BRYAN) 0.3 mg/0.3 mL AtIn Inject 0.3 mLs (0.3 mg total) into the muscle once. for 1 dose    hydrocortisone 2.5 % ointment Apply topically.    montelukast (SINGULAIR) 10 mg tablet Take 1 tablet (10 mg total) by mouth every evening. (Patient not taking: Reported on 3/12/2023)    naproxen (NAPROSYN) 500 MG tablet Take 1 tablet (500 mg total) by mouth 2 (two) times daily with meals.    olopatadine (PATANOL) 0.1 % ophthalmic solution Place 1 drop into both eyes 2 (two) times daily. (Patient not taking: Reported on 3/12/2023)    pseudoephedrine-DM-guaiFENesin (POLY-VENT DM) 60- mg Tab Take 1 tablet by mouth every 6 (six) hours as needed.   Last reviewed on 3/12/2023 10:59 AM by Theo Jennings MD    Review of patient's allergies indicates:   Allergen Reactions    Iodine and iodide containing products     Shellfish containing products     Last reviewed on  3/13/2023 12:57 AM by Hermelinda Duggan      Tasks added this encounter   No tasks added.   Tasks due within next 3 months   6/1/2023 - Refill Coordination Outreach (1 time occurrence)     Halle Marcus  [facility] - Specialty Pharmacy  [address] [place] [id number]  Phone: [phone]  Fax: [phone]

## 2023-06-26 ENCOUNTER — SPECIALTY PHARMACY (OUTPATIENT)
Dept: PHARMACY | Facility: CLINIC | Age: 23
End: 2023-06-26
Payer: MEDICAID

## 2023-06-26 NOTE — TELEPHONE ENCOUNTER
Specialty Pharmacy - Refill Coordination    Specialty Medication Orders Linked to Encounter      Flowsheet Row Most Recent Value   Medication #1 dupilumab (DUPIXENT PEN) 300 mg/2 mL PnIj (Order#[id number], Rx#[id number])            Refill Questions - Documented Responses      Flowsheet Row Most Recent Value   Patient Availability and HIPAA Verification    Does patient want to proceed with activity? Yes   HIPAA/medical authority confirmed? Yes   Relationship to patient of person spoken to? Self   Refill Screening Questions    Changes to allergies? No   Changes to medications? No   New conditions since last clinic visit? No   Unplanned office visit, urgent care, ED, or hospital admission in the last 4 weeks? No   How does patient/caregiver feel medication is working? Good   Financial problems or insurance changes? No   How many doses of your specialty medications were missed in the last 4 weeks? 0   Would patient like to speak to a pharmacist? No   When does the patient need to receive the medication? 07/06/23   Refill Delivery Questions    How will the patient receive the medication? MEDRx   When does the patient need to receive the medication? 07/06/23   Shipping Address Home   Address in [facility] confirmed and updated if neccessary? Yes   Expected Copay ($) 0   Is the patient able to afford the medication copay? Yes   Payment Method zero copay   Days supply of Refill 28   Supplies needed? No supplies needed   Refill activity completed? Yes   Refill activity plan Refill scheduled   Shipment/Pickup Date: 07/03/23            Current Outpatient Medications   Medication Sig    albuterol (PROVENTIL/VENTOLIN HFA) 90 mcg/actuation inhaler Inhale 4 puffs into the lungs every 4 (four) hours as needed for Wheezing or Shortness of Breath (and/or persistent coughing). Rescue (Patient not taking: Reported on 3/12/2023)    azelastine (ASTELIN) 137 mcg (0.1 %) nasal spray 1 spray (137 mcg total) by Nasal route 2 (two)  times daily. (Patient not taking: Reported on 6/23/2021)    cetirizine (ZYRTEC) 10 MG tablet Take 1 tablet (10 mg total) by mouth once daily.    chlorhexidine (PERIDEX) 0.12 % solution SMARTSIG:By Mouth    diphenhydrAMINE (BENADRYL) 25 mg capsule Take 25 mg by mouth once daily. Takes one 25mg capsule every morning    dupilumab (DUPIXENT PEN) 300 mg/2 mL PnIj Inject 300 mg into the skin every 14 (fourteen) days. (Patient not taking: Reported on 2/8/2023)    EPINEPHrine (EPIPEN 2-BRYAN) 0.3 mg/0.3 mL AtIn Inject 0.3 mLs (0.3 mg total) into the muscle once. for 1 dose    hydrocortisone 2.5 % ointment Apply topically.    montelukast (SINGULAIR) 10 mg tablet Take 1 tablet (10 mg total) by mouth every evening. (Patient not taking: Reported on 3/12/2023)    naproxen (NAPROSYN) 500 MG tablet Take 1 tablet (500 mg total) by mouth 2 (two) times daily with meals.    olopatadine (PATANOL) 0.1 % ophthalmic solution Place 1 drop into both eyes 2 (two) times daily. (Patient not taking: Reported on 3/12/2023)    pseudoephedrine-DM-guaiFENesin (POLY-VENT DM) 60- mg Tab Take 1 tablet by mouth every 6 (six) hours as needed.   Last reviewed on 3/12/2023 10:59 AM by Theo Jennings MD    Review of patient's allergies indicates:   Allergen Reactions    Iodine and iodide containing products     Shellfish containing products     Last reviewed on  3/13/2023 12:57 AM by Hermelinda Duggan      Tasks added this encounter   No tasks added.   Tasks due within next 3 months   6/26/2023 - Refill Coordination Outreach (1 time occurrence)     Madhuri Mahmood  [facility] - Specialty Pharmacy  [address] [place] [id number]  Phone: [phone]  Fax: [phone]

## 2023-07-24 ENCOUNTER — SPECIALTY PHARMACY (OUTPATIENT)
Dept: PHARMACY | Facility: CLINIC | Age: 23
End: 2023-07-24
Payer: MEDICAID

## 2023-07-24 NOTE — TELEPHONE ENCOUNTER
Outgoing call regarding pt dupixent. Pt stated that his next injection 8/4. Will follow up to set that up

## 2023-07-26 NOTE — TELEPHONE ENCOUNTER
Specialty Pharmacy - Refill Coordination    Specialty Medication Orders Linked to Encounter      Flowsheet Row Most Recent Value   Medication #1 dupilumab (DUPIXENT PEN) 300 mg/2 mL PnIj (Order#[id number], Rx#[id number])            Refill Questions - Documented Responses      Flowsheet Row Most Recent Value   Patient Availability and HIPAA Verification    Does patient want to proceed with activity? Yes   HIPAA/medical authority confirmed? Yes   Relationship to patient of person spoken to? Self   Refill Screening Questions    Changes to allergies? No   Changes to medications? No   New conditions since last clinic visit? No   Unplanned office visit, urgent care, ED, or hospital admission in the last 4 weeks? No   How does patient/caregiver feel medication is working? Excellent   Financial problems or insurance changes? No   How many doses of your specialty medications were missed in the last 4 weeks? 0   Would patient like to speak to a pharmacist? No   When does the patient need to receive the medication? 08/04/23   Refill Delivery Questions    How will the patient receive the medication? MEDRx   When does the patient need to receive the medication? 08/04/23   Shipping Address Home   Address in [facility] confirmed and updated if neccessary? Yes   Expected Copay ($) 0   Is the patient able to afford the medication copay? Yes   Payment Method zero copay   Days supply of Refill 28   Supplies needed? No supplies needed   Refill activity completed? Yes   Refill activity plan Refill scheduled   Shipment/Pickup Date: 08/02/23            Current Outpatient Medications   Medication Sig    albuterol (PROVENTIL/VENTOLIN HFA) 90 mcg/actuation inhaler Inhale 4 puffs into the lungs every 4 (four) hours as needed for Wheezing or Shortness of Breath (and/or persistent coughing). Rescue (Patient not taking: Reported on 3/12/2023)    azelastine (ASTELIN) 137 mcg (0.1 %) nasal spray 1 spray (137 mcg total) by Nasal route 2  (two) times daily. (Patient not taking: Reported on 6/23/2021)    cetirizine (ZYRTEC) 10 MG tablet Take 1 tablet (10 mg total) by mouth once daily.    chlorhexidine (PERIDEX) 0.12 % solution SMARTSIG:By Mouth    diphenhydrAMINE (BENADRYL) 25 mg capsule Take 25 mg by mouth once daily. Takes one 25mg capsule every morning    dupilumab (DUPIXENT PEN) 300 mg/2 mL PnIj Inject 300 mg into the skin every 14 (fourteen) days. (Patient not taking: Reported on 2/8/2023)    EPINEPHrine (EPIPEN 2-BRYAN) 0.3 mg/0.3 mL AtIn Inject 0.3 mLs (0.3 mg total) into the muscle once. for 1 dose    hydrocortisone 2.5 % ointment Apply topically.    montelukast (SINGULAIR) 10 mg tablet Take 1 tablet (10 mg total) by mouth every evening. (Patient not taking: Reported on 3/12/2023)    naproxen (NAPROSYN) 500 MG tablet Take 1 tablet (500 mg total) by mouth 2 (two) times daily with meals.    olopatadine (PATANOL) 0.1 % ophthalmic solution Place 1 drop into both eyes 2 (two) times daily. (Patient not taking: Reported on 3/12/2023)    pseudoephedrine-DM-guaiFENesin (POLY-VENT DM) 60- mg Tab Take 1 tablet by mouth every 6 (six) hours as needed.   Last reviewed on 3/12/2023 10:59 AM by Theo Jennings MD    Review of patient's allergies indicates:   Allergen Reactions    Iodine and iodide containing products     Shellfish containing products     Last reviewed on  3/13/2023 12:57 AM by Hermelinda Duggan      Tasks added this encounter   No tasks added.   Tasks due within next 3 months   7/26/2023 - Refill Coordination Outreach (1 time occurrence)     Adelina Gold, Patient Care Assistant  [facility]  [address]  [facility] [address]  Phone: [phone]  Fax: [phone]

## 2023-08-01 ENCOUNTER — OFFICE VISIT (OUTPATIENT)
Dept: PEDIATRIC PULMONOLOGY | Facility: CLINIC | Age: 23
End: 2023-08-01
Payer: MEDICAID

## 2023-08-01 VITALS
WEIGHT: 248.88 LBS | RESPIRATION RATE: 18 BRPM | BODY MASS INDEX: 34.84 KG/M2 | HEART RATE: 70 BPM | HEIGHT: 71 IN | OXYGEN SATURATION: 98 %

## 2023-08-01 DIAGNOSIS — J45.909 ASTHMA, UNSPECIFIED ASTHMA SEVERITY, UNSPECIFIED WHETHER COMPLICATED, UNSPECIFIED WHETHER PERSISTENT: Primary | ICD-10-CM

## 2023-08-01 DIAGNOSIS — J45.50 SEVERE PERSISTENT ASTHMA, UNSPECIFIED WHETHER COMPLICATED: ICD-10-CM

## 2023-08-01 LAB
FEF 25 75 LLN: 3.38
FEF 25 75 PRE REF: 104.1 %
FEF 25 75 REF: 5.2
FEV05 LLN: 1.8
FEV05 REF: 3.24
FEV1 FVC LLN: 73
FEV1 FVC PRE REF: 101.3 %
FEV1 FVC REF: 85
FEV1 LLN: 3.94
FEV1 PRE REF: 98.6 %
FEV1 REF: 4.85
FVC LLN: 4.67
FVC PRE REF: 96.3 %
FVC REF: 5.75
PEF LLN: 7.96
PEF PRE REF: 90.4 %
PEF REF: 10.36
PHYSICIAN COMMENT: NORMAL
PRE FEF 25 75: 5.41 L/S (ref 3.38–7.4)
PRE FET 100: 3.18 SEC
PRE FEV05 REF: 111 %
PRE FEV1 FVC: 86.37 % (ref 73.45–94.95)
PRE FEV1: 4.78 L (ref 3.94–5.74)
PRE FEV5: 3.59 L (ref 1.8–4.67)
PRE FVC: 5.54 L (ref 4.67–6.85)
PRE PEF: 9.37 L/S (ref 7.96–12.76)

## 2023-08-01 PROCEDURE — 94010 BREATHING CAPACITY TEST: CPT | Mod: PBBFAC | Performed by: GENERAL ACUTE CARE HOSPITAL

## 2023-08-01 PROCEDURE — 99999PBSHW PR PBB SHADOW TECHNICAL ONLY FILED TO HB: ICD-10-PCS | Mod: PBBFAC,,,

## 2023-08-01 PROCEDURE — 99214 OFFICE O/P EST MOD 30 MIN: CPT | Mod: S$PBB,25,, | Performed by: GENERAL ACUTE CARE HOSPITAL

## 2023-08-01 PROCEDURE — 99999PBSHW PR PBB SHADOW TECHNICAL ONLY FILED TO HB: Mod: PBBFAC,,,

## 2023-08-01 PROCEDURE — 1159F PR MEDICATION LIST DOCUMENTED IN MEDICAL RECORD: ICD-10-PCS | Mod: CPTII,,, | Performed by: GENERAL ACUTE CARE HOSPITAL

## 2023-08-01 PROCEDURE — 3008F BODY MASS INDEX DOCD: CPT | Mod: CPTII,,, | Performed by: GENERAL ACUTE CARE HOSPITAL

## 2023-08-01 PROCEDURE — 99213 OFFICE O/P EST LOW 20 MIN: CPT | Mod: 25,PBBFAC | Performed by: GENERAL ACUTE CARE HOSPITAL

## 2023-08-01 PROCEDURE — 94010 BREATHING CAPACITY TEST: CPT | Mod: 26,S$PBB,, | Performed by: GENERAL ACUTE CARE HOSPITAL

## 2023-08-01 PROCEDURE — 99214 PR OFFICE/OUTPT VISIT, EST, LEVL IV, 30-39 MIN: ICD-10-PCS | Mod: S$PBB,25,, | Performed by: GENERAL ACUTE CARE HOSPITAL

## 2023-08-01 PROCEDURE — 99999 PR PBB SHADOW E&M-EST. PATIENT-LVL III: CPT | Mod: PBBFAC,,, | Performed by: GENERAL ACUTE CARE HOSPITAL

## 2023-08-01 PROCEDURE — 95012 NITRIC OXIDE EXP GAS DETER: CPT | Mod: PBBFAC | Performed by: GENERAL ACUTE CARE HOSPITAL

## 2023-08-01 PROCEDURE — 94010 BREATHING CAPACITY TEST: ICD-10-PCS | Mod: 26,S$PBB,, | Performed by: GENERAL ACUTE CARE HOSPITAL

## 2023-08-01 PROCEDURE — 99999 PR PBB SHADOW E&M-EST. PATIENT-LVL III: ICD-10-PCS | Mod: PBBFAC,,, | Performed by: GENERAL ACUTE CARE HOSPITAL

## 2023-08-01 PROCEDURE — 3008F PR BODY MASS INDEX (BMI) DOCUMENTED: ICD-10-PCS | Mod: CPTII,,, | Performed by: GENERAL ACUTE CARE HOSPITAL

## 2023-08-01 PROCEDURE — 1159F MED LIST DOCD IN RCRD: CPT | Mod: CPTII,,, | Performed by: GENERAL ACUTE CARE HOSPITAL

## 2023-08-01 RX ORDER — ALBUTEROL SULFATE 90 UG/1
4 AEROSOL, METERED RESPIRATORY (INHALATION) EVERY 4 HOURS PRN
Qty: 8.5 G | Refills: 3 | Status: SHIPPED | OUTPATIENT
Start: 2023-08-01 | End: 2024-07-31

## 2023-08-01 NOTE — PROGRESS NOTES
Pediatric Pulmonology clinic  Follow up    Pop is a 22 y.o. male here for asthma follow up.    HPI/RESPIRATORY SYMPTOMS:     The patient's last visit with me was on 2/8/2023.  Pop is a 22 year old male with history of severe eczema, asthma, here for follow up. Symptoms are well controlled. I consider that his allergic conjunctivitis and eczema are part of his atopic profile and not a side effect of the medication. I encouraged Pop to discuss with A/I for further inquiries about the side effect profile of Dupixent in case there are new side effects reported that I am not aware of.    PLAN:  Continue Dupixent as prescribed  I recommended the use of the following rescue medications for asthma exacerbation:  Albuterol 4 puffs/1 vial Q4 hrs PRN cough, wheezing or dyspnea or to begin at the first start of a URI. Patient has enough refills.  Asthma action plan, spacer provided today with demonstration and educational pamphlets provided  Avoidance of precipitants    Eczema on upper lip.   -Soak and seal technique stressed  -Emolients and topical steroids per prn prescriptions    Allergic conjunctivitis  Continue patanol as prescribed    Interval changes  No ED/hospitalizations. Denies any baseline symptoms. HSAT and PSG were normal. Continues with eye redness, lip eczema improved. No eye drops seem to help.    Asthma Symptoms/Control:  Controllers: Dupixent every 2 weeks  How often missing/week: never  Rescue: Albuterol (Last >6 months)  Spacer use: yes  OCS course since last visit: none  Triggers: URI, weather change  His current symptoms in the last 3 months include:    Cough - 0 per week  Wheezing - 0 per week  SOB - 0 per week  He does not have nocturnal coughing when not acutely ill with respiratory illness.   Prolonged coughing with a URI (2-3 weeks duration): none.  EIB: -.     Comorbidities:  AR: denies  AD: Not well controlled on Dupixent. Does not have a consistent skin regimen. Uses cerave lotion  prn. Has dermatology follow up.  FA: Shellfish, has unexpired epipen.  SRBD: denies snoring, breathing pauses. + mouth breathing.HSAT and PSG were normal.  GERD: denies    SH:   Lives with girlfriend and parents    Environmental history:                    Pets in the home: none                    Hiren: tile                    Air conditioning: Good condition                    Heating: Good condition                    Mold / water damage: denies                    Tobacco smoke: denies                    Goes to school to become a  and works        Travel history: denies            Current Medications:     Current Outpatient Medications:     dupilumab (DUPIXENT PEN) 300 mg/2 mL PnIj, Inject 300 mg into the skin every 14 (fourteen) days., Disp: 4 mL, Rfl: 11    albuterol (PROVENTIL/VENTOLIN HFA) 90 mcg/actuation inhaler, Inhale 4 puffs into the lungs every 4 (four) hours as needed for Wheezing or Shortness of Breath (and/or persistent coughing). Rescue (Patient not taking: Reported on 3/12/2023), Disp: 8.5 g, Rfl: 3    azelastine (ASTELIN) 137 mcg (0.1 %) nasal spray, 1 spray (137 mcg total) by Nasal route 2 (two) times daily. (Patient not taking: Reported on 6/23/2021), Disp: 30 mL, Rfl: 0    cetirizine (ZYRTEC) 10 MG tablet, Take 1 tablet (10 mg total) by mouth once daily. (Patient not taking: Reported on 8/1/2023), Disp: 30 tablet, Rfl: 2    chlorhexidine (PERIDEX) 0.12 % solution, SMARTSIG:By Mouth, Disp: , Rfl:     diphenhydrAMINE (BENADRYL) 25 mg capsule, Take 25 mg by mouth once daily. Takes one 25mg capsule every morning, Disp: , Rfl:     EPINEPHrine (EPIPEN 2-BRYAN) 0.3 mg/0.3 mL AtIn, Inject 0.3 mLs (0.3 mg total) into the muscle once. for 1 dose, Disp: 2 each, Rfl: 1    hydrocortisone 2.5 % ointment, Apply topically., Disp: , Rfl:     montelukast (SINGULAIR) 10 mg tablet, Take 1 tablet (10 mg total) by mouth every evening. (Patient not taking: Reported on 3/12/2023), Disp: 30 tablet, Rfl: 11     naproxen (NAPROSYN) 500 MG tablet, Take 1 tablet (500 mg total) by mouth 2 (two) times daily with meals. (Patient not taking: Reported on 8/1/2023), Disp: 20 tablet, Rfl: 0    olopatadine (PATANOL) 0.1 % ophthalmic solution, Place 1 drop into both eyes 2 (two) times daily. (Patient not taking: Reported on 3/12/2023), Disp: 5 mL, Rfl: 3    pseudoephedrine-DM-guaiFENesin (POLY-VENT DM) 60- mg Tab, Take 1 tablet by mouth every 6 (six) hours as needed. (Patient not taking: Reported on 8/1/2023), Disp: 20 tablet, Rfl: 0      PMH:   Past Medical History:   Diagnosis Date    ADHD (attention deficit hyperactivity disorder)     Allergy, unspecified not elsewhere classified     IgE 2810, immunocap positive for multiple aeroallergens    Asthma, not well controlled     Asthma, well controlled     Conjunctivitis, allergic     Conjunctivitis, allergic     Eczema     Patient non adherence     Rhinitis, allergic     Rhinitis, allergic        Patient Active Problem List   Diagnosis    Eczema    Attention deficit hyperactivity disorder (ADHD)    Allergic state    Patient non adherence    Rhinitis, allergic    Conjunctivitis, allergic    Allergy    Asthma, not well controlled    Asthma, well controlled    Snoring    Sleep-disordered breathing    KYLER (obstructive sleep apnea)         Past medical, family, and social history were reviewed & updated as appropriate. There are no changes unless otherwise noted.        Review of Systems   Constitutional: Negative for activity change, appetite change, fever and irritability.   HENT: Negative for rhinorrhea.    Eyes: Negative for discharge.   Respiratory: Negative for apnea, cough, choking, wheezing and stridor.    Cardiovascular: Negative for sweating with feeds and cyanosis.   Gastrointestinal: Negative for diarrhea and vomiting.   Genitourinary: Negative for decreased urine volume.   Musculoskeletal: Negative for joint swelling.   Integumentary:  Negative for color change and rash.  "  Neurological: Negative for seizures.   Hematological: Does not bruise/bleed easily.     Physical Exam    Pulse 70   Resp 18   Ht 5' 11.26" (1.81 m)   Wt 112.9 kg (248 lb 14.4 oz)   SpO2 98%   BMI 34.46 kg/m²     General: Patient is a well-nourished, in no apparent distress. Appears well hydrated.   Head: Normocephalic, atraumatic.  Eyes: Pupils equal, round and reactive to light. Extraocular muscles appear intact. Conjunctivitis noted, no purulent dicharge. No ptosis.   Ears: Clear external auditory canals. Pinnae normal is shape and contour. No pre-auricular pits or skin tags. TMs grey bilaterally. No erythema or bulging.   Nose: Normal pink mucosa, no discharge or blood visible. Normal midline septum.   Mouth: moist mucous membranes. Pharynx: Tonsils 1. Dickey tongue position 2. Pharynx shows no erythema or ulcerations. Normal movement of soft palate. No micrognathia or retrognathia.   Neck: Grossly non-swollen. No tracheal deviation. No decrease in ROM. No lymphadenopathy, goiter or masses detected.   Chest:  No increase of accessory muscles. Lungs are clear to auscultation bilaterally. No stridor, wheezes, crackles, or rubs. Good air movement.   CV: Regular rate and rhythm. Normal S1 with normally split S2 on respiration. No murmurs, gallops or rubs. 2+ pulses. Capillary refill less than 2 sec.   Abdomen: Soft, non-tender, non-distended. Bowel signs present. No noted splenomegaly. No masses.   Extremities: Warm, no clubbing, cyanosis or edema.   Skin: maculopapular rash on upper lip. Improved.      TODAY'S LABS AND EVALUATION:    Spirometry today: normal.FeNO intermediate.    ASSESSMENT:  Pop is a 22 year old male with history of severe eczema, asthma, here for follow up. Symptoms are well controlled.  I encouraged Pop to discuss with A/I for further inquiries about the side effect profile of Dupixent in case there are new side effects reported that I am not aware of.    PLAN:  Continue Dupixent " as prescribed  I recommended the use of the following rescue medications for asthma exacerbation:  Albuterol 4 puffs/1 vial Q4 hrs PRN cough, wheezing or dyspnea or to begin at the first start of a URI. Patient has enough refills.  Asthma action plan, spacer provided today with demonstration and educational pamphlets provided  Avoidance of precipitants    Eczema on upper lip.   -Soak and seal technique stressed  -Emolients and topical steroids per prn prescriptions    Allergic conjunctivitis  Continue patanol as prescribed  Follow up with A and I    Call or return sooner if the symptoms worsen, do not improve as expected or new symptoms or problems arise.    Thank you for allowing me to assist in the care of Pop.  Please do not hesitate to contact me if I can be of further assistance.     30 minutes of total time spent on the encounter, which includes face to face time and non-face to face time preparing to see the patient (eg, review of tests), Obtaining and/or reviewing separately obtained history, Documenting clinical information in the electronic or other health record, Independently interpreting results (not separately reported) and communicating results to the patient/family/caregiver, or Care coordination (not separately reported).    Leyden Lozada, M.D.  Pediatric Pulmonology and Sleep Medicine  Office: [phone]  Fax: [phone]  August 1, 2023       cc:    [address] [place] [address]  [place] [id number]    Addendum  Discussed performing HSAT instead of in lab study. I will be able to interpret results.     Leyden M. Lozada-Jimenez

## 2023-08-03 ENCOUNTER — TELEPHONE (OUTPATIENT)
Dept: PHARMACY | Facility: CLINIC | Age: 23
End: 2023-08-03
Payer: MEDICAID

## 2023-08-03 NOTE — TELEPHONE ENCOUNTER
Called to inform him we were changing his delivery method to [employer]. Verified delivery address. New delivery date will be 8/4. Patient was ok with update.

## 2023-11-17 ENCOUNTER — PATIENT MESSAGE (OUTPATIENT)
Dept: ADMINISTRATIVE | Facility: OTHER | Age: 23
End: 2023-11-17
Payer: MEDICAID

## 2023-12-05 ENCOUNTER — OFFICE VISIT (OUTPATIENT)
Dept: ALLERGY | Facility: CLINIC | Age: 23
End: 2023-12-05
Payer: MEDICAID

## 2023-12-05 VITALS
DIASTOLIC BLOOD PRESSURE: 83 MMHG | SYSTOLIC BLOOD PRESSURE: 137 MMHG | HEART RATE: 72 BPM | BODY MASS INDEX: 36.6 KG/M2 | WEIGHT: 264.31 LBS | OXYGEN SATURATION: 97 %

## 2023-12-05 DIAGNOSIS — L20.9 ATOPIC DERMATITIS, UNSPECIFIED TYPE: ICD-10-CM

## 2023-12-05 DIAGNOSIS — J45.998 WELL CONTROLLED PERSISTENT ASTHMA: ICD-10-CM

## 2023-12-05 DIAGNOSIS — H10.89 OTHER CONJUNCTIVITIS OF BOTH EYES: Primary | ICD-10-CM

## 2023-12-05 PROCEDURE — 1159F MED LIST DOCD IN RCRD: CPT | Mod: CPTII,,, | Performed by: ALLERGY & IMMUNOLOGY

## 2023-12-05 PROCEDURE — 99214 PR OFFICE/OUTPT VISIT, EST, LEVL IV, 30-39 MIN: ICD-10-PCS | Mod: S$PBB,,, | Performed by: ALLERGY & IMMUNOLOGY

## 2023-12-05 PROCEDURE — 99999 PR PBB SHADOW E&M-EST. PATIENT-LVL III: ICD-10-PCS | Mod: PBBFAC,,, | Performed by: ALLERGY & IMMUNOLOGY

## 2023-12-05 PROCEDURE — 3008F BODY MASS INDEX DOCD: CPT | Mod: CPTII,,, | Performed by: ALLERGY & IMMUNOLOGY

## 2023-12-05 PROCEDURE — 3008F PR BODY MASS INDEX (BMI) DOCUMENTED: ICD-10-PCS | Mod: CPTII,,, | Performed by: ALLERGY & IMMUNOLOGY

## 2023-12-05 PROCEDURE — 3079F DIAST BP 80-89 MM HG: CPT | Mod: CPTII,,, | Performed by: ALLERGY & IMMUNOLOGY

## 2023-12-05 PROCEDURE — 3075F PR MOST RECENT SYSTOLIC BLOOD PRESS GE 130-139MM HG: ICD-10-PCS | Mod: CPTII,,, | Performed by: ALLERGY & IMMUNOLOGY

## 2023-12-05 PROCEDURE — 3079F PR MOST RECENT DIASTOLIC BLOOD PRESSURE 80-89 MM HG: ICD-10-PCS | Mod: CPTII,,, | Performed by: ALLERGY & IMMUNOLOGY

## 2023-12-05 PROCEDURE — 1159F PR MEDICATION LIST DOCUMENTED IN MEDICAL RECORD: ICD-10-PCS | Mod: CPTII,,, | Performed by: ALLERGY & IMMUNOLOGY

## 2023-12-05 PROCEDURE — 99999 PR PBB SHADOW E&M-EST. PATIENT-LVL III: CPT | Mod: PBBFAC,,, | Performed by: ALLERGY & IMMUNOLOGY

## 2023-12-05 PROCEDURE — 99214 OFFICE O/P EST MOD 30 MIN: CPT | Mod: S$PBB,,, | Performed by: ALLERGY & IMMUNOLOGY

## 2023-12-05 PROCEDURE — 3075F SYST BP GE 130 - 139MM HG: CPT | Mod: CPTII,,, | Performed by: ALLERGY & IMMUNOLOGY

## 2023-12-05 PROCEDURE — 99213 OFFICE O/P EST LOW 20 MIN: CPT | Mod: PBBFAC | Performed by: ALLERGY & IMMUNOLOGY

## 2023-12-05 RX ORDER — EPINEPHRINE 0.3 MG/.3ML
1 INJECTION SUBCUTANEOUS ONCE
Qty: 2 EACH | Refills: 1 | Status: SHIPPED | OUTPATIENT
Start: 2023-12-05 | End: 2023-12-05

## 2023-12-05 RX ORDER — CYCLOSPORINE 0.5 MG/ML
1 EMULSION OPHTHALMIC 2 TIMES DAILY
Qty: 60 EACH | Refills: 2 | Status: SHIPPED | OUTPATIENT
Start: 2023-12-05 | End: 2024-12-04

## 2023-12-05 RX ORDER — DUPILUMAB 300 MG/2ML
300 INJECTION, SOLUTION SUBCUTANEOUS
Qty: 4 ML | Refills: 11 | Status: ACTIVE | OUTPATIENT
Start: 2023-12-05

## 2023-12-05 NOTE — PROGRESS NOTES
Subjective:       Patient ID: Pop Lawler is a 23 y.o. male.    Chief Complaint:  Allergies (Redness and irritation of eyes after Dupixent shot)      HPI    Pt w hx asthma and AD. Both continue to be very well controlled on q 2 week Dupixent. Off daily ICS, need for albuterol is rare, usu just w occ URI. And AD has been controlled w moisturizer alone. Denies any recent need topical steroids.    Main concern is continued conjunctivitis sx's that started w Dupixent therapy. No improvement w ophthalmic antihistamine. Artificial tears provide some relief, though minimal. 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     Hx from 2/1/22:  Pt presents to [facility], continue Dupixent therapy. He is on Dupixent for both asthma and atopic dermatitis. Has had both since early childhood. Years ago had been on Xolair. Stopped xolair when Dupixent rxd by dermatology for his atopic dermatitis. Follows w [place]. Lately Dupixent had been written by pulmonary.  On Dupixent, he has been able to stop daily ICS. Hasn't needed oral steroids since starting Dupixent. Albuterol use is rare. Occ needs it w over exertion.  Atopic dermatitis is well controlled w routine use Cerave moisturizer. Has not been needing topical steroids lately. Does note a slight increase in pruritus days prior to scheduled dupixent.  Recalls positive patch testing w dermatology 2 weeks ago, for recent contact dermatitis. Doesn't recall to what agents.  Also w hx shellfish allergy--needs epipen        Past Medical History:   Diagnosis Date    ADHD (attention deficit hyperactivity disorder)     Allergy, unspecified not elsewhere classified     IgE 2810, immunocap positive for multiple aeroallergens    Asthma, not well controlled     Asthma, well controlled     Conjunctivitis, allergic     Conjunctivitis, allergic     Eczema     Patient non adherence     Rhinitis, allergic      Rhinitis, allergic        Family History   Problem Relation Age of Onset    Asthma Mother     No Known Problems Father          Review of Systems   Constitutional:  Negative for activity change, fatigue and fever.   HENT:  Negative for congestion, postnasal drip, rhinorrhea, sinus pressure and sneezing.    Eyes:  Negative for discharge, redness and itching.   Respiratory:  Negative for cough, shortness of breath and wheezing.    Cardiovascular:  Negative for chest pain.   Gastrointestinal:  Negative for constipation, diarrhea, nausea and vomiting.   Genitourinary:  Negative for difficulty urinating.   Musculoskeletal:  Negative for joint swelling and myalgias.   Skin:  Negative for rash.   Neurological:  Negative for headaches.   Hematological:  Does not bruise/bleed easily.   Psychiatric/Behavioral:  Negative for behavioral problems and sleep disturbance.         Objective:   Physical Exam  Constitutional:       General: He is not in acute distress.     Appearance: He is well-developed. He is not diaphoretic.   HENT:      Head: Normocephalic and atraumatic.      Right Ear: Tympanic membrane and external ear normal.      Left Ear: Tympanic membrane and external ear normal.      Nose: Nose normal.      Mouth/Throat:      Pharynx: No oropharyngeal exudate.   Eyes:      General:         Right eye: No discharge.         Left eye: No discharge.      Conjunctiva/sclera: Conjunctivae normal.   Neck:      Thyroid: No thyromegaly.   Cardiovascular:      Rate and Rhythm: Normal rate and regular rhythm.   Pulmonary:      Effort: Pulmonary effort is normal. No respiratory distress.      Breath sounds: Normal breath sounds. No wheezing.   Abdominal:      General: Bowel sounds are normal. There is no distension.      Palpations: Abdomen is soft.      Tenderness: There is no abdominal tenderness.   Musculoskeletal:         General: Normal range of motion.      Cervical back: Normal range of motion and neck supple.    Lymphadenopathy:      Cervical: No cervical adenopathy.   Skin:     General: Skin is warm and dry.      Findings: No erythema or rash.   Neurological:      Mental Status: He is alert and oriented to person, place, and time.      Motor: No abnormal muscle tone.   Psychiatric:         Behavior: Behavior normal.         Thought Content: Thought content normal.         Judgment: Judgment normal.           Assessment:       1. Other conjunctivitis of both eyes --dupilumab induced conjunctivitis   2. Well controlled persistent asthma    3. Atopic dermatitis, unspecified type           Plan:       Pop was seen today for asthma and allergies.    Diagnoses and all orders for this visit:    Atopic dermatitis, unspecified type  -     dupilumab (DUPIXENT PEN) 300 mg/2 mL PnIj; Inject 300 mg into the skin every 14 (fourteen) days.    Routine moisturization    Well controlled persistent asthma    -     dupilumab (DUPIXENT PEN) 300 mg/2 mL PnIj; Inject 300 mg into the skin every 14 (fourteen) days.     Prn albuterol      Shellfish allergy  -     EPINEPHrine (EPIPEN 2-BRYAN) 0.3 mg/0.3 mL AtIn; Inject 0.3 mLs (0.3 mg total) into the muscle once. for 1 dose  Continue strict shellfish avoidance    Conjunctivitis  Systane prn. If no improvement, recommend Ophtho eval

## 2023-12-06 ENCOUNTER — DOCUMENTATION ONLY (OUTPATIENT)
Dept: ALLERGY | Facility: CLINIC | Age: 23
End: 2023-12-06
Payer: MEDICAID

## 2023-12-06 NOTE — PROGRESS NOTES
Received PA request from [employer] for cyclosporine 0.05 %   Spoke with Becca with Medicaid PA department @ [phone]    Auth Dates 12/6/2023 - 12/5/2024  Auth # [id number]    [facility] has been notified of approval- Son @ [phone]

## 2024-03-14 ENCOUNTER — PATIENT MESSAGE (OUTPATIENT)
Dept: ALLERGY | Facility: CLINIC | Age: 24
End: 2024-03-14
Payer: MEDICAID

## 2024-04-05 ENCOUNTER — PATIENT MESSAGE (OUTPATIENT)
Dept: ALLERGY | Facility: CLINIC | Age: 24
End: 2024-04-05
Payer: MEDICAID

## 2024-04-12 ENCOUNTER — TELEPHONE (OUTPATIENT)
Dept: ALLERGY | Facility: CLINIC | Age: 24
End: 2024-04-12
Payer: MEDICAID

## 2024-04-15 DIAGNOSIS — L20.9 ATOPIC DERMATITIS, UNSPECIFIED TYPE: ICD-10-CM

## 2024-04-15 DIAGNOSIS — J45.998 WELL CONTROLLED PERSISTENT ASTHMA: ICD-10-CM

## 2024-04-15 RX ORDER — DUPILUMAB 300 MG/2ML
300 INJECTION, SOLUTION SUBCUTANEOUS
Qty: 4 ML | Refills: 11 | Status: SHIPPED | OUTPATIENT
Start: 2024-04-15 | End: 2024-04-15 | Stop reason: SDUPTHER

## 2024-04-15 RX ORDER — DUPILUMAB 300 MG/2ML
300 INJECTION, SOLUTION SUBCUTANEOUS
Qty: 4 ML | Refills: 11 | Status: SHIPPED | OUTPATIENT
Start: 2024-04-15

## 2024-04-23 ENCOUNTER — TELEPHONE (OUTPATIENT)
Dept: ALLERGY | Facility: CLINIC | Age: 24
End: 2024-04-23
Payer: MEDICAID

## 2024-04-23 NOTE — TELEPHONE ENCOUNTER
----- Message from Radha Stephen Jer sent at 4/23/2024 10:32 AM CDT -----  Regarding: checking status of forms  Contact: Sujatha @[phone]  Sujatha calling with [employer] is checking the status of rx dupilumab (DUPIXENT PEN) 300 mg/2 mL PnIj. Caller states the request was sent out earlier this month about prior auth. Please c/b to advise.. Thanks

## 2024-04-23 NOTE — TELEPHONE ENCOUNTER
Called number on message. Left detailed message stating my name and number, pt name and , Dupixent PA approval    Authorized from 2024 to July 10, 2024

## 2024-04-25 ENCOUNTER — TELEPHONE (OUTPATIENT)
Dept: ALLERGY | Facility: CLINIC | Age: 24
End: 2024-04-25
Payer: MEDICAID

## 2024-04-25 NOTE — TELEPHONE ENCOUNTER
----- Message from Emiliana Lima sent at 4/24/2024 12:13 PM CDT -----  Regarding: Rx needs prior auth    Can the clinic reply in [facility]:        Please refill the medication(s) listed below. Please call the patient when the prescription(s) is ready for  at this phone number   Phar called states below Rx needs prior auth. Please advise        Medication #1 dupilumab (DUPIXENT PEN) 300 mg/2 mL Thais      Medication #2          Preferred Pharmacy:   [facility]  [address] [address]  [phone] Fax# [phone]

## 2024-04-25 NOTE — TELEPHONE ENCOUNTER
Faxed approval note from 4/4/2024, along with message from Marci yesterday in regards to Dupixent being approved. Medication is approved from 01/12/2024 to 07/10/2024. Faxed to [phone]. Sent to [employer]. Fax sent to scanning

## 2024-04-30 ENCOUNTER — TELEPHONE (OUTPATIENT)
Dept: ALLERGY | Facility: CLINIC | Age: 24
End: 2024-04-30
Payer: MEDICAID

## 2024-04-30 ENCOUNTER — PATIENT MESSAGE (OUTPATIENT)
Dept: ALLERGY | Facility: CLINIC | Age: 24
End: 2024-04-30
Payer: MEDICAID

## 2024-04-30 NOTE — TELEPHONE ENCOUNTER
----- Message from Rudolph Santana sent at 4/29/2024 11:09 AM CDT -----  Pop Lawler calling regarding Patient Advice (message) [facility] calling to f/u on the apparel for Dupixent she states they received a call from the office stating it was approval but the insurance company states they did not received the appeal [facility]  [facility] [phone]

## 2024-04-30 NOTE — TELEPHONE ENCOUNTER
"I tried to return call to [facility], not able to speak with a representative, voicemail option only. I left detailed message on machine regarding approval for dupixent. I called patient to inform him as well. I sent portal message to patient with PA information to provide to [facility] if he is able to reach them. He too, stated he has an "issue reaching [facility]."      Specialty Pharmacy  4/3/2024  [facility]     Tory Falk, PharmD  Pharmacist Referral Authorization  Reason for Visit     All Conversations: Referral Authorization  (Newest Message First)  April 4, 2024  Deandra Marte, PharmD     PL    4/4/24  4:10 PM  Note  Dupixent PA approved. Effective: 4/4/2024 - 07/10/2024. Test claim shows [facility] is OON. Pt must fill with [facility]      Dupixent rx transferred to     [facility]   [address] [address]   SAIMA #: [id number]   (P): [phone]   Antonio Bonilla, RafatD         "

## 2024-05-01 ENCOUNTER — TELEPHONE (OUTPATIENT)
Dept: ALLERGY | Facility: CLINIC | Age: 24
End: 2024-05-01
Payer: MEDICAID

## 2024-05-01 NOTE — TELEPHONE ENCOUNTER
Attempted to return [employer] call. I had to leave a detailed message with my name, my call back number, pt's name, , member ID, Dupixent, Approval and asked that she return my call.    Dupixent PA approved. Effective: 2024 - 07/10/2024. Test claim shows [facility] is OON. Pt must fill with [facility]      Dupixent rx transferred to     [facility]   [address] [place] [address]   SAIMA #: [id number]   (P): [phone]   Antonio Bonilla, PharmD

## 2024-05-01 NOTE — TELEPHONE ENCOUNTER
----- Message from Carol Singh sent at 5/1/2024  9:26 AM CDT -----  Regarding: returning call  Contact: [phone]  Who Called:Sujatha from [facility]    Who Left Message for Patient:Anai    Does the patient know what this is regarding?:    Would the patient rather a call back or a response via [facility]? Call back    Best Call Back Number:[phone]    Additional Information: Sujatha stated pls fax dupixent approval letter to them at fax#[phone], Sujatha stated spoke with the insurance plan appeals dept and they do not have an approval in file

## 2024-05-03 ENCOUNTER — TELEPHONE (OUTPATIENT)
Dept: ALLERGY | Facility: CLINIC | Age: 24
End: 2024-05-03
Payer: MEDICAID

## 2024-05-03 ENCOUNTER — PATIENT MESSAGE (OUTPATIENT)
Dept: ALLERGY | Facility: CLINIC | Age: 24
End: 2024-05-03
Payer: MEDICAID

## 2024-05-03 NOTE — TELEPHONE ENCOUNTER
"Phone call returned to [place] with [facility]- will refax denial letter. " Looks like there my be different insurances on file."  "

## 2024-05-03 NOTE — TELEPHONE ENCOUNTER
----- Message from Kalli Irizarry sent at 5/3/2024  8:53 AM CDT -----  Regarding: pt advice  Contact: Sujatha pathak/Elio @[phone]  Caller is asking to speak to renuka Ospina. Pls call to discuss. Caller stated the pt insurance company does not have an appeal online. Fax#[phone]

## 2024-05-14 ENCOUNTER — PATIENT MESSAGE (OUTPATIENT)
Dept: ALLERGY | Facility: CLINIC | Age: 24
End: 2024-05-14
Payer: MEDICAID

## 2024-05-15 ENCOUNTER — TELEPHONE (OUTPATIENT)
Dept: ALLERGY | Facility: CLINIC | Age: 24
End: 2024-05-15
Payer: MEDICAID

## 2024-05-17 ENCOUNTER — TELEPHONE (OUTPATIENT)
Dept: ALLERGY | Facility: CLINIC | Age: 24
End: 2024-05-17
Payer: MEDICAID

## 2024-05-17 NOTE — TELEPHONE ENCOUNTER
Phone call to Sujatha with [facility]. I informed her of the insurance card that patient provided me with via the portal. Apparently, patient's insurance is different from what we have documented in the chart. The authorization on file may have been under old insurance. I have reached out to [facility] who obtained the authorization for dupixent. I have yet to hear back from [facility]. Please see teams message sent to Tory Falk, Pharm D on 5/15/2024      Hi Tory, can you please tell me what insurance company granted approval for dupixent? [facility] is stating the PA # that is documented and that was provided to them when the Rx was sent is not active.     Dupixent PA approved. Effective: 4/4/2024 - 07/10/2024. Test claim shows [facility] is OON. Pt must fill with [facility]       5- any updates ?       I faxed the appeals letter that was signed by Dr. Polanco along with clinics to [facility] ([phone]) attn: Sujatha     I also faxed signed appeal letter and clinicals to [facility] c/o  @ [phone]    Phone # [phone]-

## 2024-07-01 ENCOUNTER — PATIENT MESSAGE (OUTPATIENT)
Dept: ALLERGY | Facility: CLINIC | Age: 24
End: 2024-07-01
Payer: MEDICAID

## 2024-11-01 ENCOUNTER — OFFICE VISIT (OUTPATIENT)
Dept: URGENT CARE | Facility: CLINIC | Age: 24
End: 2024-11-01
Payer: COMMERCIAL

## 2024-11-01 VITALS
TEMPERATURE: 99 F | SYSTOLIC BLOOD PRESSURE: 127 MMHG | DIASTOLIC BLOOD PRESSURE: 74 MMHG | WEIGHT: 260 LBS | HEIGHT: 70 IN | HEART RATE: 114 BPM | RESPIRATION RATE: 21 BRPM | OXYGEN SATURATION: 98 % | BODY MASS INDEX: 37.22 KG/M2

## 2024-11-01 DIAGNOSIS — R05.9 COUGH, UNSPECIFIED TYPE: ICD-10-CM

## 2024-11-01 DIAGNOSIS — J30.9 ALLERGIC RHINITIS, UNSPECIFIED SEASONALITY, UNSPECIFIED TRIGGER: ICD-10-CM

## 2024-11-01 DIAGNOSIS — J45.909 ASTHMA, UNSPECIFIED ASTHMA SEVERITY, UNSPECIFIED WHETHER COMPLICATED, UNSPECIFIED WHETHER PERSISTENT: Primary | ICD-10-CM

## 2024-11-01 LAB
CTP QC/QA: YES
SARS-COV-2 AG RESP QL IA.RAPID: NEGATIVE

## 2024-11-01 RX ORDER — ALBUTEROL SULFATE 0.83 MG/ML
2.5 SOLUTION RESPIRATORY (INHALATION) EVERY 6 HOURS PRN
Qty: 75 ML | Refills: 0 | Status: SHIPPED | OUTPATIENT
Start: 2024-11-01 | End: 2025-11-01

## 2024-11-01 RX ORDER — ALBUTEROL SULFATE 0.83 MG/ML
2.5 SOLUTION RESPIRATORY (INHALATION) EVERY 6 HOURS PRN
Qty: 75 ML | Refills: 0 | Status: CANCELLED | OUTPATIENT
Start: 2024-11-01 | End: 2025-11-01

## 2024-11-01 RX ORDER — PREDNISONE 20 MG/1
40 TABLET ORAL DAILY
Qty: 8 TABLET | Refills: 0 | Status: SHIPPED | OUTPATIENT
Start: 2024-11-02 | End: 2024-11-06

## 2024-11-01 RX ORDER — PREDNISONE 20 MG/1
40 TABLET ORAL
Status: COMPLETED | OUTPATIENT
Start: 2024-11-01 | End: 2024-11-01

## 2024-11-01 RX ADMIN — PREDNISONE 40 MG: 20 TABLET ORAL at 06:11
